# Patient Record
Sex: FEMALE | Race: WHITE | NOT HISPANIC OR LATINO | URBAN - METROPOLITAN AREA
[De-identification: names, ages, dates, MRNs, and addresses within clinical notes are randomized per-mention and may not be internally consistent; named-entity substitution may affect disease eponyms.]

---

## 2020-07-25 ENCOUNTER — TELEPHONE ENCOUNTER (OUTPATIENT)
Dept: URBAN - METROPOLITAN AREA CLINIC 13 | Facility: CLINIC | Age: 50
End: 2020-07-25

## 2020-07-25 RX ORDER — AMITRIPTYLINE HYDROCHLORIDE 10 MG/1
TAKE 1 TABLET AT BEDTIME TABLET, FILM COATED ORAL
Refills: 0 | OUTPATIENT
End: 2019-05-10

## 2020-07-25 RX ORDER — DICYCLOMINE HYDROCHLORIDE 10 MG/1
TAKE 1 CAPSULE 3 TIMES DAILY CAPSULE ORAL
Refills: 0 | OUTPATIENT
End: 2019-05-10

## 2020-07-25 RX ORDER — IBUPROFEN 600 MG/1
TAKE 1 TABLET EVERY 6 TO 8 HOURS AS NEEDED TABLET ORAL
Refills: 0 | OUTPATIENT
End: 2019-05-10

## 2020-07-25 RX ORDER — DEXLANSOPRAZOLE 60 MG/1
TAKE 1 CAPSULE DAILY EVERY MORNING BEFORE BREAKFAST CAPSULE, DELAYED RELEASE ORAL
Qty: 90 | Refills: 3 | OUTPATIENT
Start: 2019-05-10 | End: 2020-03-31

## 2020-07-25 RX ORDER — OMEPRAZOLE 40 MG/1
TAKE 1 CAPSULE TWICE DAILY CAPSULE, DELAYED RELEASE ORAL
Refills: 0 | OUTPATIENT
End: 2018-09-07

## 2020-07-25 RX ORDER — PROMETHAZINE HYDROCHLORIDE 25 MG/1
TAKE 1 TABLET BY MOUTH EVERY 8 HOURS AS NEEDED FOR NAUSEA TABLET ORAL
Refills: 0 | OUTPATIENT
End: 2019-11-21

## 2020-07-25 RX ORDER — PROMETHAZINE HYDROCHLORIDE 25 MG/1
INSERT 1 SUPP EVERY 6-8 HOURS PRN FOR NAUSEA SUPPOSITORY RECTAL
Qty: 12 | Refills: 3 | OUTPATIENT
Start: 2018-10-05 | End: 2019-11-21

## 2020-07-25 RX ORDER — BUSPIRONE HYDROCHLORIDE 5 MG/1
TAKE 1 TABLET BY MOUTH TWICE DAILY TABLET ORAL
Qty: 60 | Refills: 5 | OUTPATIENT
Start: 2018-09-07 | End: 2019-05-10

## 2020-07-25 RX ORDER — HALOPERIDOL 5 MG/1
TAKE 1 TABLET TWICE DAILY AS NEEDED TABLET ORAL
Refills: 0 | OUTPATIENT
End: 2018-10-08

## 2020-07-25 RX ORDER — PANTOPRAZOLE SODIUM 40 MG/1
TAKE ONE TABLET BY MOUTH TWICE A DAY TABLET, DELAYED RELEASE ORAL
Qty: 180 | Refills: 3 | OUTPATIENT
End: 2019-07-02

## 2020-07-25 RX ORDER — CITALOPRAM 20 MG/1
TAKE 1 TABLET DAILY AS DIRECTED TABLET ORAL
Qty: 30 | Refills: 11 | OUTPATIENT
Start: 2018-07-20 | End: 2018-09-07

## 2020-07-25 RX ORDER — AMITRIPTYLINE HYDROCHLORIDE 25 MG/1
TAKE 1 TABLET AT BEDTIME TABLET, FILM COATED ORAL
Qty: 30 | Refills: 3 | OUTPATIENT
End: 2018-10-02

## 2020-07-25 RX ORDER — OMEPRAZOLE 20 MG/1
TAKE 1 TABLET DAILY TABLET, DELAYED RELEASE ORAL
Refills: 3 | OUTPATIENT
End: 2019-07-02

## 2020-07-25 RX ORDER — VANCOMYCIN HYDROCHLORIDE 125 MG/1
TAKE 1 CAPSULE 4 TIMES DAILY CAPSULE ORAL
Refills: 0 | OUTPATIENT
Start: 2009-07-07 | End: 2018-07-02

## 2020-07-25 RX ORDER — SUCRALFATE 1 G/1
TAKE 1 TABLET EVERY 6 HOURS TABLET ORAL
Refills: 0 | OUTPATIENT
End: 2018-09-07

## 2020-07-25 RX ORDER — PROMETHAZINE HYDROCHLORIDE 25 MG/1
TAKE 1 TABLET EVERY 8 HOURS PRN TABLET ORAL
Qty: 90 | Refills: 1 | OUTPATIENT
Start: 2019-08-09 | End: 2019-11-21

## 2020-07-25 RX ORDER — FLUOXETINE HYDROCHLORIDE 40 MG/1
CAPSULE ORAL
Refills: 0 | OUTPATIENT
Start: 2019-05-10 | End: 2019-12-10

## 2020-07-25 RX ORDER — SCOLOPAMINE TRANSDERMAL SYSTEM 1 MG/1
APPLY 1 PATCH EVERY 3 DAYS PATCH, EXTENDED RELEASE TRANSDERMAL
Qty: 1 | Refills: 3 | OUTPATIENT
Start: 2018-09-07 | End: 2019-05-10

## 2020-07-25 RX ORDER — MEDROXYPROGESTERONE ACETATE 2.5 MG/1
TAKE 1 TABLET DAILY AS DIRECTED TABLET ORAL
Refills: 0 | OUTPATIENT
End: 2019-12-10

## 2020-07-25 RX ORDER — ONDANSETRON HCL 8 MG
TAKE 1 TABLET EVERY 8 HOURS AS NEEDED TABLET ORAL
Qty: 90 | Refills: 1 | OUTPATIENT
End: 2018-10-02

## 2020-07-26 ENCOUNTER — TELEPHONE ENCOUNTER (OUTPATIENT)
Dept: URBAN - METROPOLITAN AREA CLINIC 13 | Facility: CLINIC | Age: 50
End: 2020-07-26

## 2020-07-26 RX ORDER — PREDNISONE 20 MG/1
TABLET ORAL
Qty: 6 | Refills: 0 | Status: ACTIVE | COMMUNITY
Start: 2018-11-26

## 2020-07-26 RX ORDER — ALPRAZOLAM 2 MG/1
TABLET ORAL
Qty: 180 | Refills: 0 | Status: ACTIVE | COMMUNITY
Start: 2019-03-27

## 2020-07-26 RX ORDER — ONDANSETRON HYDROCHLORIDE 4 MG/1
TABLET, FILM COATED ORAL
Qty: 30 | Refills: 0 | Status: ACTIVE | COMMUNITY
Start: 2018-12-18

## 2020-07-26 RX ORDER — ONDANSETRON HYDROCHLORIDE 4 MG/1
TABLET, FILM COATED ORAL
Qty: 30 | Refills: 0 | Status: ACTIVE | COMMUNITY
Start: 2019-01-16

## 2020-07-26 RX ORDER — FLUCONAZOLE 100 MG/1
TABLET ORAL
Qty: 8 | Refills: 0 | Status: ACTIVE | COMMUNITY
Start: 2019-10-16

## 2020-07-26 RX ORDER — AMOXICILLIN AND CLAVULANATE POTASSIUM 875; 125 MG/1; MG/1
TABLET, FILM COATED ORAL
Qty: 20 | Refills: 0 | Status: ACTIVE | COMMUNITY
Start: 2018-12-18

## 2020-07-26 RX ORDER — ESTRADIOL 1 MG/1
TABLET ORAL
Qty: 90 | Refills: 0 | Status: ACTIVE | COMMUNITY
Start: 2019-07-17

## 2020-07-26 RX ORDER — ONDANSETRON HYDROCHLORIDE 4 MG/1
TABLET, FILM COATED ORAL
Qty: 30 | Refills: 0 | Status: ACTIVE | COMMUNITY
Start: 2018-07-25

## 2020-07-26 RX ORDER — FLUCONAZOLE 150 MG/1
TABLET ORAL
Qty: 2 | Refills: 0 | Status: ACTIVE | COMMUNITY
Start: 2018-12-18

## 2020-07-26 RX ORDER — CYCLOBENZAPRINE HYDROCHLORIDE 10 MG/1
TABLET, FILM COATED ORAL
Qty: 15 | Refills: 0 | Status: ACTIVE | COMMUNITY
Start: 2018-11-26

## 2020-07-26 RX ORDER — BUPROPION HYDROCHLORIDE 75 MG/1
TABLET ORAL
Qty: 90 | Refills: 0 | Status: ACTIVE | COMMUNITY
Start: 2020-02-18

## 2020-07-26 RX ORDER — PROMETHAZINE HYDROCHLORIDE 12.5 MG/1
TABLET ORAL
Qty: 30 | Refills: 0 | Status: ACTIVE | COMMUNITY
Start: 2019-06-21

## 2020-07-26 RX ORDER — FLUOXETINE 10 MG/1
CAPSULE ORAL
Qty: 30 | Refills: 0 | Status: ACTIVE | COMMUNITY
Start: 2018-10-08

## 2020-07-26 RX ORDER — EPINEPHRINE 0.3 MG/.3ML
INJECTION INTRAMUSCULAR
Qty: 2 | Refills: 0 | Status: ACTIVE | COMMUNITY
Start: 2019-04-29

## 2020-07-26 RX ORDER — ZOLPIDEM TARTRATE 12.5 MG/1
TABLET, EXTENDED RELEASE ORAL
Qty: 30 | Refills: 0 | Status: ACTIVE | COMMUNITY
Start: 2018-04-06

## 2020-07-26 RX ORDER — DEXTROAMPHETAMINE SACCHARATE, AMPHETAMINE ASPARTATE, DEXTROAMPHETAMINE SULFATE AND AMPHETAMINE SULFATE 5; 5; 5; 5 MG/1; MG/1; MG/1; MG/1
TABLET ORAL
Qty: 180 | Refills: 0 | Status: ACTIVE | COMMUNITY
Start: 2019-01-11

## 2020-07-26 RX ORDER — DICYCLOMINE HYDROCHLORIDE 20 MG/1
TABLET ORAL
Qty: 30 | Refills: 0 | Status: ACTIVE | COMMUNITY
Start: 2018-01-31

## 2020-07-26 RX ORDER — HYDROXYZINE PAMOATE 25 MG/1
CAPSULE ORAL
Qty: 90 | Refills: 0 | Status: ACTIVE | COMMUNITY
Start: 2018-09-19

## 2020-07-26 RX ORDER — TRAMADOL HYDROCHLORIDE 50 MG/1
TAKE 1 TABLET 4 TIMES DAILY AS NEEDED FOR PAIN TABLET ORAL
Qty: 20 | Refills: 0 | Status: ACTIVE | COMMUNITY

## 2020-07-26 RX ORDER — ACYCLOVIR 400 MG/1
TABLET ORAL
Qty: 24 | Refills: 0 | Status: ACTIVE | COMMUNITY
Start: 2019-10-18

## 2020-07-26 RX ORDER — ESOMEPRAZOLE MAGNESIUM 40 MG/1
TAKE 1 CAPSULE DAILY CAPSULE, DELAYED RELEASE PELLETS ORAL
Qty: 90 | Refills: 2 | Status: ACTIVE | COMMUNITY
Start: 2020-03-31

## 2020-07-26 RX ORDER — IBUPROFEN 800 MG/1
TABLET ORAL
Qty: 30 | Refills: 0 | Status: ACTIVE | COMMUNITY
Start: 2018-09-08

## 2020-07-26 RX ORDER — DEXTROAMPHETAMINE SACCHARATE, AMPHETAMINE ASPARTATE, DEXTROAMPHETAMINE SULFATE AND AMPHETAMINE SULFATE 5; 5; 5; 5 MG/1; MG/1; MG/1; MG/1
TABLET ORAL
Qty: 60 | Refills: 0 | Status: ACTIVE | COMMUNITY
Start: 2018-05-18

## 2020-07-26 RX ORDER — HYDROCODONE BITARTRATE AND ACETAMINOPHEN 5; 325 MG/1; MG/1
TABLET ORAL
Qty: 12 | Refills: 0 | Status: ACTIVE | COMMUNITY
Start: 2018-09-10

## 2020-07-26 RX ORDER — FLUOXETINE 10 MG/1
CAPSULE ORAL
Qty: 90 | Refills: 0 | Status: ACTIVE | COMMUNITY
Start: 2019-08-28

## 2020-07-26 RX ORDER — ONDANSETRON HYDROCHLORIDE 4 MG/1
TK 1 T PO Q 6 H PRN NV TABLET, FILM COATED ORAL
Qty: 30 | Refills: 0 | Status: ACTIVE | COMMUNITY
Start: 2018-01-31

## 2020-07-26 RX ORDER — AMOXICILLIN 875 MG/1
TABLET, FILM COATED ORAL
Qty: 20 | Refills: 0 | Status: ACTIVE | COMMUNITY
Start: 2019-01-26

## 2020-07-26 RX ORDER — ALPRAZOLAM 0.25 MG/1
TABLET ORAL
Qty: 2 | Refills: 0 | Status: ACTIVE | COMMUNITY
Start: 2018-10-01

## 2020-07-26 RX ORDER — PROMETHAZINE HYDROCHLORIDE 25 MG/1
TAKE 1 TABLET EVERY 6 HOURS PRN NAUSEA TABLET ORAL
Qty: 60 | Refills: 3 | Status: ACTIVE | COMMUNITY
Start: 2019-12-10

## 2020-07-26 RX ORDER — PROMETHAZINE HYDROCHLORIDE 12.5 MG/1
TABLET ORAL
Qty: 30 | Refills: 0 | Status: ACTIVE | COMMUNITY
Start: 2018-06-11

## 2020-07-26 RX ORDER — CHLORHEXIDINE GLUCONATE 0.12% ORAL RINSE 1.2 MG/ML
SOLUTION ORAL
Qty: 473 | Refills: 0 | Status: ACTIVE | COMMUNITY
Start: 2018-09-19

## 2020-07-26 RX ORDER — HYDROCODONE BITARTRATE AND ACETAMINOPHEN 5; 325 MG/1; MG/1
TABLET ORAL
Qty: 16 | Refills: 0 | Status: ACTIVE | COMMUNITY
Start: 2018-09-27

## 2020-07-26 RX ORDER — DEXTROAMPHETAMINE SACCHARATE, AMPHETAMINE ASPARTATE, DEXTROAMPHETAMINE SULFATE AND AMPHETAMINE SULFATE 5; 5; 5; 5 MG/1; MG/1; MG/1; MG/1
TK 2 TS PO QD TABLET ORAL
Qty: 60 | Refills: 0 | Status: ACTIVE | COMMUNITY
Start: 2018-02-05

## 2020-07-26 RX ORDER — PROMETHAZINE HYDROCHLORIDE 12.5 MG/1
TABLET ORAL
Qty: 30 | Refills: 0 | Status: ACTIVE | COMMUNITY
Start: 2018-07-15

## 2020-07-26 RX ORDER — DRONABINOL 10 MG/1
TAKE 1 CAPSULE TWICE DAILY CAPSULE ORAL
Qty: 60 | Refills: 5 | Status: ACTIVE | COMMUNITY
Start: 2019-05-10

## 2020-07-26 RX ORDER — METOPROLOL SUCCINATE 25 MG/1
TAKE 1 TABLET DAILY TABLET, EXTENDED RELEASE ORAL
Refills: 0 | Status: ACTIVE | COMMUNITY
Start: 2019-05-10

## 2020-07-26 RX ORDER — LOSARTAN POTASSIUM 100 MG/1
TAKE 1 TABLET DAILY TABLET, FILM COATED ORAL
Refills: 0 | Status: ACTIVE | COMMUNITY

## 2020-07-26 RX ORDER — ALBUTEROL SULFATE 90 UG/1
AEROSOL, METERED RESPIRATORY (INHALATION)
Qty: 8 | Refills: 0 | Status: ACTIVE | COMMUNITY
Start: 2017-10-08

## 2020-07-26 RX ORDER — OXYCODONE HYDROCHLORIDE 5 MG/1
TABLET ORAL
Qty: 30 | Refills: 0 | Status: ACTIVE | COMMUNITY
Start: 2019-07-17

## 2020-07-26 RX ORDER — DEXTROAMPHETAMINE SACCHARATE, AMPHETAMINE ASPARTATE, DEXTROAMPHETAMINE SULFATE AND AMPHETAMINE SULFATE 5; 5; 5; 5 MG/1; MG/1; MG/1; MG/1
TABLET ORAL
Qty: 60 | Refills: 0 | Status: ACTIVE | COMMUNITY
Start: 2018-01-08

## 2020-07-26 RX ORDER — PROMETHAZINE HYDROCHLORIDE AND DEXTROMETHORPHAN HYDROBROMIDE 6.25; 15 MG/5ML; MG/5ML
SOLUTION ORAL
Qty: 120 | Refills: 0 | Status: ACTIVE | COMMUNITY
Start: 2019-01-26

## 2020-07-26 RX ORDER — TIZANIDINE 4 MG/1
TABLET ORAL
Qty: 30 | Refills: 0 | Status: ACTIVE | COMMUNITY
Start: 2018-11-29

## 2020-07-26 RX ORDER — ALPRAZOLAM 2 MG/1
TABLET ORAL
Qty: 180 | Refills: 0 | Status: ACTIVE | COMMUNITY
Start: 2019-01-11

## 2020-07-26 RX ORDER — FLUCONAZOLE 150 MG/1
TABLET ORAL
Qty: 2 | Refills: 0 | Status: ACTIVE | COMMUNITY
Start: 2019-03-21

## 2020-07-26 RX ORDER — CLONAZEPAM 1 MG/1
TK 1 T PO QHS TABLET ORAL
Qty: 30 | Refills: 0 | Status: ACTIVE | COMMUNITY
Start: 2018-01-08

## 2020-07-26 RX ORDER — PREDNISONE 10 MG/1
TABLET ORAL
Qty: 14 | Refills: 0 | Status: ACTIVE | COMMUNITY
Start: 2020-02-20

## 2020-07-26 RX ORDER — ALPRAZOLAM 0.5 MG/1
TAKE ONE TABLET BY MOUTH EVERY 6 TO 8 HOURS AS NEEDED TABLET ORAL
Qty: 30 | Refills: 2 | Status: ACTIVE | COMMUNITY
Start: 2018-09-07

## 2020-07-26 RX ORDER — IBUPROFEN 800 MG/1
TABLET ORAL
Qty: 30 | Refills: 0 | Status: ACTIVE | COMMUNITY
Start: 2019-07-17

## 2020-07-26 RX ORDER — HYDROCODONE BITARTRATE AND ACETAMINOPHEN 5; 325 MG/1; MG/1
TABLET ORAL
Qty: 4 | Refills: 0 | Status: ACTIVE | COMMUNITY
Start: 2019-10-09

## 2020-07-26 RX ORDER — DESIPRAMINE HYDROCHLORIDE 10 MG/1
TABLET, FILM COATED ORAL
Qty: 30 | Refills: 0 | Status: ACTIVE | COMMUNITY
Start: 2018-01-31

## 2020-07-26 RX ORDER — LINACLOTIDE 72 UG/1
CAPSULE, GELATIN COATED ORAL
Qty: 30 | Refills: 0 | Status: ACTIVE | COMMUNITY
Start: 2018-06-19

## 2020-07-26 RX ORDER — PREDNISONE 20 MG/1
TABLET ORAL
Qty: 10 | Refills: 0 | Status: ACTIVE | COMMUNITY
Start: 2020-02-26

## 2020-07-26 RX ORDER — ACYCLOVIR 800 MG/1
TABLET ORAL
Qty: 20 | Refills: 0 | Status: ACTIVE | COMMUNITY
Start: 2018-02-16

## 2020-07-26 RX ORDER — ACYCLOVIR 800 MG/1
TK 1 T PO BID TABLET ORAL
Qty: 20 | Refills: 0 | Status: ACTIVE | COMMUNITY
Start: 2017-07-24

## 2020-07-26 RX ORDER — ONDANSETRON 4 MG/1
TABLET, ORALLY DISINTEGRATING ORAL
Qty: 30 | Refills: 0 | Status: ACTIVE | COMMUNITY
Start: 2018-08-03

## 2020-07-26 RX ORDER — CEFUROXIME AXETIL 250 MG/1
TABLET ORAL
Qty: 20 | Refills: 0 | Status: ACTIVE | COMMUNITY
Start: 2020-02-20

## 2020-07-26 RX ORDER — ZOLPIDEM TARTRATE 10 MG/1
TABLET, FILM COATED ORAL
Qty: 15 | Refills: 0 | Status: ACTIVE | COMMUNITY
Start: 2018-09-09

## 2020-07-26 RX ORDER — DRONABINOL 5 MG/1
CAPSULE ORAL
Qty: 60 | Refills: 0 | Status: ACTIVE | COMMUNITY
Start: 2018-06-26

## 2020-07-26 RX ORDER — METOCLOPRAMIDE HYDROCHLORIDE 5 MG/5ML
SOLUTION ORAL
Qty: 946 | Refills: 0 | Status: ACTIVE | COMMUNITY
Start: 2019-05-24

## 2020-07-26 RX ORDER — BUTALBITA,ACETAMINOPHEN AND CAFFEINE 50; 300; 40 MG/1; MG/1; MG/1
CAPSULE ORAL
Qty: 12 | Refills: 0 | Status: ACTIVE | COMMUNITY
Start: 2019-03-26

## 2020-07-26 RX ORDER — ALPRAZOLAM 1 MG/1
TABLET ORAL
Qty: 30 | Refills: 0 | Status: ACTIVE | COMMUNITY
Start: 2018-05-18

## 2020-07-26 RX ORDER — HYDROCODONE BITARTRATE AND ACETAMINOPHEN 7.5; 325 MG/15ML; MG/15ML
SOLUTION ORAL
Qty: 473 | Refills: 0 | Status: ACTIVE | COMMUNITY
Start: 2018-05-25

## 2020-07-26 RX ORDER — ZOLPIDEM TARTRATE 12.5 MG/1
TABLET, EXTENDED RELEASE ORAL
Qty: 30 | Refills: 0 | Status: ACTIVE | COMMUNITY
Start: 2018-05-18

## 2020-07-26 RX ORDER — FLUOXETINE 10 MG/1
CAPSULE ORAL
Qty: 90 | Refills: 0 | Status: ACTIVE | COMMUNITY
Start: 2019-01-11

## 2020-07-26 RX ORDER — CLINDAMYCIN HYDROCHLORIDE 150 MG/1
CAPSULE ORAL
Qty: 60 | Refills: 0 | Status: ACTIVE | COMMUNITY
Start: 2018-09-09

## 2020-07-26 RX ORDER — DRONABINOL 2.5 MG/1
CAPSULE ORAL
Qty: 60 | Refills: 0 | Status: ACTIVE | COMMUNITY
Start: 2018-06-11

## 2020-07-26 RX ORDER — ESTRADIOL 0.05 MG/D
FILM, EXTENDED RELEASE TRANSDERMAL
Qty: 8 | Refills: 0 | Status: ACTIVE | COMMUNITY
Start: 2018-02-27

## 2020-07-26 RX ORDER — TRIAMCINOLONE ACETONIDE 1 MG/G
CREAM TOPICAL
Qty: 30 | Refills: 0 | Status: ACTIVE | COMMUNITY
Start: 2019-10-18

## 2020-07-26 RX ORDER — METHOCARBAMOL 750 MG/1
TABLET, FILM COATED ORAL
Qty: 10 | Refills: 0 | Status: ACTIVE | COMMUNITY
Start: 2019-04-29

## 2020-07-26 RX ORDER — LORAZEPAM 1 MG/1
TABLET ORAL
Qty: 30 | Refills: 0 | Status: ACTIVE | COMMUNITY
Start: 2018-08-15

## 2020-07-26 RX ORDER — QUETIAPINE FUMARATE 50 MG/1
TABLET, FILM COATED ORAL
Qty: 30 | Refills: 0 | Status: ACTIVE | COMMUNITY
Start: 2018-08-03

## 2020-07-26 RX ORDER — PENICILLIN V POTASSIUM 500 MG/1
TABLET, FILM COATED ORAL
Qty: 14 | Refills: 0 | Status: ACTIVE | COMMUNITY
Start: 2018-09-10

## 2020-07-26 RX ORDER — CODEINE PHOSPHATE AND GUAIFENESIN 10; 100 MG/5ML; MG/5ML
SOLUTION ORAL
Qty: 120 | Refills: 0 | Status: ACTIVE | COMMUNITY
Start: 2020-02-26

## 2020-07-26 RX ORDER — NIFEDIPINE 10 MG/1
CAPSULE ORAL
Qty: 30 | Refills: 0 | Status: ACTIVE | COMMUNITY
Start: 2018-08-03

## 2020-07-26 RX ORDER — DEXTROAMPHETAMINE SACCHARATE, AMPHETAMINE ASPARTATE, DEXTROAMPHETAMINE SULFATE AND AMPHETAMINE SULFATE 5; 5; 5; 5 MG/1; MG/1; MG/1; MG/1
TABLET ORAL
Qty: 180 | Refills: 0 | Status: ACTIVE | COMMUNITY
Start: 2019-08-02

## 2020-07-26 RX ORDER — LORAZEPAM 1 MG/1
TK 1 T PO  BID PRN FOR ANXIETY TABLET ORAL
Qty: 60 | Refills: 0 | Status: ACTIVE | COMMUNITY
Start: 2018-06-18

## 2020-07-26 RX ORDER — NAPROXEN 500 MG/1
TABLET ORAL
Qty: 20 | Refills: 0 | Status: ACTIVE | COMMUNITY
Start: 2017-12-07

## 2020-07-26 RX ORDER — PREDNISONE 20 MG/1
TK 3 TS PO QD FOR 4 DAYS TABLET ORAL
Qty: 12 | Refills: 0 | Status: ACTIVE | COMMUNITY
Start: 2018-09-10

## 2020-07-26 RX ORDER — ONDANSETRON 4 MG/1
TABLET, ORALLY DISINTEGRATING ORAL
Qty: 9 | Refills: 0 | Status: ACTIVE | COMMUNITY
Start: 2017-12-20

## 2020-07-26 RX ORDER — AMOXICILLIN AND CLAVULANATE POTASSIUM 875; 125 MG/1; MG/1
TABLET, FILM COATED ORAL
Qty: 20 | Refills: 0 | Status: ACTIVE | COMMUNITY
Start: 2019-03-21

## 2020-07-26 RX ORDER — AMOXICILLIN 500 MG/1
CAPSULE ORAL
Qty: 30 | Refills: 0 | Status: ACTIVE | COMMUNITY
Start: 2018-09-08

## 2020-07-26 RX ORDER — BUSPIRONE HYDROCHLORIDE 10 MG/1
TAKE 1 TABLET TWICE DAILY TABLET ORAL
Qty: 180 | Refills: 2 | Status: ACTIVE | COMMUNITY
Start: 2020-02-10

## 2020-07-26 RX ORDER — AMOXICILLIN 500 MG/1
CAPSULE ORAL
Qty: 22 | Refills: 0 | Status: ACTIVE | COMMUNITY
Start: 2019-10-08

## 2020-07-26 RX ORDER — DEXTROAMPHETAMINE SACCHARATE, AMPHETAMINE ASPARTATE, DEXTROAMPHETAMINE SULFATE AND AMPHETAMINE SULFATE 5; 5; 5; 5 MG/1; MG/1; MG/1; MG/1
TABLET ORAL
Qty: 30 | Refills: 0 | Status: ACTIVE | COMMUNITY
Start: 2018-12-13

## 2020-07-26 RX ORDER — METOPROLOL SUCCINATE 25 MG/1
TABLET, EXTENDED RELEASE ORAL
Qty: 90 | Refills: 0 | Status: ACTIVE | COMMUNITY
Start: 2019-04-08

## 2020-07-26 RX ORDER — LANSOPRAZOLE 30 MG/1
TAKE 1 CAPSULE TWICE DAILY 30 MINUTES BEFORE MEALS CAPSULE, DELAYED RELEASE ORAL
Qty: 60 | Refills: 5 | Status: ACTIVE | COMMUNITY
Start: 2020-02-10

## 2020-07-26 RX ORDER — ALPRAZOLAM 1 MG/1
TABLET ORAL
Qty: 120 | Refills: 0 | Status: ACTIVE | COMMUNITY
Start: 2018-12-06

## 2020-07-26 RX ORDER — PROMETHAZINE HYDROCHLORIDE 6.25 MG/5ML
TAKE 5 ML EVERY 6 HOURS PRN N/V SYRUP ORAL
Qty: 300 | Refills: 3 | Status: ACTIVE | COMMUNITY
Start: 2019-12-10

## 2020-07-26 RX ORDER — ESTRADIOL 0.07 MG/D
FILM, EXTENDED RELEASE TRANSDERMAL
Qty: 8 | Refills: 0 | Status: ACTIVE | COMMUNITY
Start: 2018-03-15

## 2020-07-26 RX ORDER — ZOLPIDEM TARTRATE 10 MG/1
TABLET, FILM COATED ORAL
Qty: 30 | Refills: 0 | Status: ACTIVE | COMMUNITY
Start: 2018-03-02

## 2020-07-26 RX ORDER — MIRTAZAPINE 15 MG/1
TABLET, FILM COATED ORAL
Qty: 30 | Refills: 0 | Status: ACTIVE | COMMUNITY
Start: 2018-08-03

## 2020-07-26 RX ORDER — TRIAZOLAM 0.25 MG/1
TABLET ORAL
Qty: 2 | Refills: 0 | Status: ACTIVE | COMMUNITY
Start: 2019-10-08

## 2020-07-26 RX ORDER — ESTRADIOL 0.07 MG/D
PLACE 1 PATCH  TWICE A WEEK PATCH TRANSDERMAL
Refills: 0 | Status: ACTIVE | COMMUNITY

## 2020-07-26 RX ORDER — ONDANSETRON 4 MG/1
TABLET, ORALLY DISINTEGRATING ORAL
Qty: 20 | Refills: 0 | Status: ACTIVE | COMMUNITY
Start: 2018-05-25

## 2020-07-26 RX ORDER — AMLODIPINE BESYLATE 5 MG/1
TABLET ORAL
Qty: 30 | Refills: 0 | Status: ACTIVE | COMMUNITY
Start: 2019-10-23

## 2020-07-26 RX ORDER — CHLORHEXIDINE GLUCONATE 0.12% ORAL RINSE 1.2 MG/ML
SOLUTION ORAL
Qty: 473 | Refills: 0 | Status: ACTIVE | COMMUNITY
Start: 2019-10-16

## 2020-07-26 RX ORDER — ESTRADIOL 2 MG/1
TAKE 1 TABLET DAILY TABLET ORAL
Refills: 0 | Status: ACTIVE | COMMUNITY
Start: 2019-07-29

## 2020-07-26 RX ORDER — FLUOXETINE 10 MG/1
CAPSULE ORAL
Qty: 90 | Refills: 0 | Status: ACTIVE | COMMUNITY
Start: 2019-06-19

## 2020-07-26 RX ORDER — MELOXICAM 15 MG/1
TABLET ORAL
Qty: 30 | Refills: 0 | Status: ACTIVE | COMMUNITY
Start: 2019-04-29

## 2020-07-26 RX ORDER — POLYETHYLENE GLYCOL 3350, SODIUM CHLORIDE, SODIUM BICARBONATE AND POTASSIUM CHLORIDE WITH LEMON FLAVOR 420; 11.2; 5.72; 1.48 G/4L; G/4L; G/4L; G/4L
POWDER, FOR SOLUTION ORAL
Qty: 4000 | Refills: 0 | Status: ACTIVE | COMMUNITY
Start: 2018-02-01

## 2020-07-26 RX ORDER — ZOLPIDEM TARTRATE 10 MG/1
TK 1 T PO HS TABLET, FILM COATED ORAL
Qty: 15 | Refills: 0 | Status: ACTIVE | COMMUNITY
Start: 2018-09-10

## 2020-07-26 RX ORDER — ESTRADIOL 0.1 MG/D
FILM, EXTENDED RELEASE TRANSDERMAL
Qty: 8 | Refills: 0 | Status: ACTIVE | COMMUNITY
Start: 2019-08-08

## 2020-07-26 RX ORDER — ALPRAZOLAM 2 MG/1
TABLET ORAL
Qty: 45 | Refills: 0 | Status: ACTIVE | COMMUNITY
Start: 2019-08-28

## 2020-07-26 RX ORDER — ALPRAZOLAM 2 MG/1
TABLET ORAL
Qty: 180 | Refills: 0 | Status: ACTIVE | COMMUNITY
Start: 2019-07-05

## 2020-07-26 RX ORDER — HYDROXYZINE HYDROCHLORIDE 25 MG/1
TAKE 1 TABLET EVERY 6 HOURS PRN TABLET, FILM COATED ORAL
Refills: 0 | Status: ACTIVE | COMMUNITY

## 2020-07-26 RX ORDER — DOXYCYCLINE HYCLATE 100 MG/1
TABLET ORAL
Qty: 20 | Refills: 0 | Status: ACTIVE | COMMUNITY
Start: 2020-02-26

## 2020-07-26 RX ORDER — PROCHLORPERAZINE MALEATE 10 MG/1
TAKE 1 TABLET EVERY 6 HOURS AS NEEDED FOR NAUSEA TABLET ORAL
Qty: 120 | Refills: 1 | Status: ACTIVE | COMMUNITY
Start: 2019-11-21

## 2020-08-06 ENCOUNTER — TELEPHONE ENCOUNTER (OUTPATIENT)
Dept: URBAN - METROPOLITAN AREA CLINIC 113 | Facility: CLINIC | Age: 50
End: 2020-08-06

## 2020-08-06 RX ORDER — OXYCODONE HYDROCHLORIDE 5 MG/1
TABLET ORAL
Qty: 30 | Refills: 0 | Status: ACTIVE | COMMUNITY
Start: 2019-07-17

## 2020-08-06 RX ORDER — BUPROPION HYDROCHLORIDE 75 MG/1
TABLET ORAL
Qty: 90 | Refills: 0 | Status: ACTIVE | COMMUNITY
Start: 2020-02-18

## 2020-08-06 RX ORDER — ALPRAZOLAM 2 MG/1
TABLET ORAL
Qty: 180 | Refills: 0 | Status: ACTIVE | COMMUNITY
Start: 2019-01-11

## 2020-08-06 RX ORDER — MELOXICAM 15 MG/1
TABLET ORAL
Qty: 30 | Refills: 0 | Status: ACTIVE | COMMUNITY
Start: 2019-04-29

## 2020-08-06 RX ORDER — ACYCLOVIR 400 MG/1
TABLET ORAL
Qty: 24 | Refills: 0 | Status: ACTIVE | COMMUNITY
Start: 2019-10-18

## 2020-08-06 RX ORDER — TIZANIDINE 4 MG/1
TABLET ORAL
Qty: 30 | Refills: 0 | Status: ACTIVE | COMMUNITY
Start: 2018-11-29

## 2020-08-06 RX ORDER — DRONABINOL 10 MG/1
TAKE 1 CAPSULE TWICE DAILY CAPSULE ORAL
Qty: 60 | Refills: 5 | Status: ACTIVE | COMMUNITY
Start: 2019-05-10

## 2020-08-06 RX ORDER — NIFEDIPINE 10 MG/1
CAPSULE ORAL
Qty: 30 | Refills: 0 | Status: ACTIVE | COMMUNITY
Start: 2018-08-03

## 2020-08-06 RX ORDER — ESTRADIOL 1 MG/1
TABLET ORAL
Qty: 90 | Refills: 0 | Status: ACTIVE | COMMUNITY
Start: 2019-07-17

## 2020-08-06 RX ORDER — CLINDAMYCIN HYDROCHLORIDE 150 MG/1
CAPSULE ORAL
Qty: 60 | Refills: 0 | Status: ACTIVE | COMMUNITY
Start: 2018-09-09

## 2020-08-06 RX ORDER — METHOCARBAMOL 750 MG/1
TABLET, FILM COATED ORAL
Qty: 10 | Refills: 0 | Status: ACTIVE | COMMUNITY
Start: 2019-04-29

## 2020-08-06 RX ORDER — ZOLPIDEM TARTRATE 10 MG/1
TABLET, FILM COATED ORAL
Qty: 30 | Refills: 0 | Status: ACTIVE | COMMUNITY
Start: 2018-03-02

## 2020-08-06 RX ORDER — ACYCLOVIR 800 MG/1
TK 1 T PO BID TABLET ORAL
Qty: 20 | Refills: 0 | Status: ACTIVE | COMMUNITY
Start: 2017-07-24

## 2020-08-06 RX ORDER — AMLODIPINE BESYLATE 5 MG/1
TABLET ORAL
Qty: 30 | Refills: 0 | Status: ACTIVE | COMMUNITY
Start: 2019-10-23

## 2020-08-06 RX ORDER — ESTRADIOL 2 MG/1
TAKE 1 TABLET DAILY TABLET ORAL
Refills: 0 | Status: ACTIVE | COMMUNITY
Start: 2019-07-29

## 2020-08-06 RX ORDER — ONDANSETRON 4 MG/1
TABLET, ORALLY DISINTEGRATING ORAL
Qty: 9 | Refills: 0 | Status: ACTIVE | COMMUNITY
Start: 2017-12-20

## 2020-08-06 RX ORDER — ALPRAZOLAM 0.25 MG/1
TABLET ORAL
Qty: 2 | Refills: 0 | Status: ACTIVE | COMMUNITY
Start: 2018-10-01

## 2020-08-06 RX ORDER — PROMETHAZINE HYDROCHLORIDE 25 MG/1
TAKE 1 TABLET EVERY 6 HOURS PRN NAUSEA TABLET ORAL
Qty: 60 | Refills: 3 | Status: ACTIVE | COMMUNITY
Start: 2019-12-10

## 2020-08-06 RX ORDER — LORAZEPAM 1 MG/1
TK 1 T PO  BID PRN FOR ANXIETY TABLET ORAL
Qty: 60 | Refills: 0 | Status: ACTIVE | COMMUNITY
Start: 2018-06-18

## 2020-08-06 RX ORDER — HYDROXYZINE PAMOATE 25 MG/1
CAPSULE ORAL
Qty: 90 | Refills: 0 | Status: ACTIVE | COMMUNITY
Start: 2018-09-19

## 2020-08-06 RX ORDER — POLYETHYLENE GLYCOL 3350, SODIUM CHLORIDE, SODIUM BICARBONATE AND POTASSIUM CHLORIDE WITH LEMON FLAVOR 420; 11.2; 5.72; 1.48 G/4L; G/4L; G/4L; G/4L
POWDER, FOR SOLUTION ORAL
Qty: 4000 | Refills: 0 | Status: ACTIVE | COMMUNITY
Start: 2018-02-01

## 2020-08-06 RX ORDER — PROCHLORPERAZINE MALEATE 10 MG/1
TAKE 1 TABLET EVERY 6 HOURS AS NEEDED FOR NAUSEA TABLET ORAL
Qty: 120 | Refills: 1 | Status: ACTIVE | COMMUNITY
Start: 2019-11-21

## 2020-08-06 RX ORDER — AMOXICILLIN 500 MG/1
CAPSULE ORAL
Qty: 30 | Refills: 0 | Status: ACTIVE | COMMUNITY
Start: 2018-09-08

## 2020-08-06 RX ORDER — CEFUROXIME AXETIL 250 MG/1
TABLET ORAL
Qty: 20 | Refills: 0 | Status: ACTIVE | COMMUNITY
Start: 2020-02-20

## 2020-08-06 RX ORDER — ESTRADIOL 0.1 MG/D
FILM, EXTENDED RELEASE TRANSDERMAL
Qty: 8 | Refills: 0 | Status: ACTIVE | COMMUNITY
Start: 2019-08-08

## 2020-08-06 RX ORDER — TRIAMCINOLONE ACETONIDE 1 MG/G
CREAM TOPICAL
Qty: 30 | Refills: 0 | Status: ACTIVE | COMMUNITY
Start: 2019-10-18

## 2020-08-06 RX ORDER — BUSPIRONE HYDROCHLORIDE 10 MG/1
TAKE 1 TABLET TWICE DAILY TABLET ORAL
Qty: 180 | Refills: 2 | Status: ACTIVE | COMMUNITY
Start: 2020-02-10

## 2020-08-06 RX ORDER — METOCLOPRAMIDE HYDROCHLORIDE 5 MG/5ML
SOLUTION ORAL
Qty: 946 | Refills: 0 | Status: ACTIVE | COMMUNITY
Start: 2019-05-24

## 2020-08-06 RX ORDER — DESIPRAMINE HYDROCHLORIDE 10 MG/1
TABLET, FILM COATED ORAL
Qty: 30 | Refills: 0 | Status: ACTIVE | COMMUNITY
Start: 2018-01-31

## 2020-08-06 RX ORDER — FLUOXETINE 10 MG/1
CAPSULE ORAL
Qty: 30 | Refills: 0 | Status: ACTIVE | COMMUNITY
Start: 2018-10-08

## 2020-08-06 RX ORDER — PROMETHAZINE HYDROCHLORIDE 6.25 MG/5ML
TAKE 5 ML EVERY 6 HOURS PRN N/V SYRUP ORAL
Qty: 300 | Refills: 3 | Status: ACTIVE | COMMUNITY
Start: 2019-12-10

## 2020-08-06 RX ORDER — DICYCLOMINE HYDROCHLORIDE 20 MG/1
TABLET ORAL
Qty: 30 | Refills: 0 | Status: ACTIVE | COMMUNITY
Start: 2018-01-31

## 2020-08-06 RX ORDER — LANSOPRAZOLE 30 MG/1
TAKE 1 CAPSULE TWICE DAILY 30 MINUTES BEFORE MEALS CAPSULE, DELAYED RELEASE ORAL
Qty: 60 | Refills: 5 | Status: ACTIVE | COMMUNITY
Start: 2020-02-10

## 2020-08-06 RX ORDER — TRAMADOL HYDROCHLORIDE 50 MG/1
TAKE 1 TABLET 4 TIMES DAILY AS NEEDED FOR PAIN TABLET ORAL
Qty: 20 | Refills: 0 | Status: ACTIVE | COMMUNITY

## 2020-08-06 RX ORDER — DRONABINOL 5 MG/1
CAPSULE ORAL
Qty: 60 | Refills: 0 | Status: ACTIVE | COMMUNITY
Start: 2018-06-26

## 2020-08-06 RX ORDER — PROMETHAZINE HYDROCHLORIDE AND DEXTROMETHORPHAN HYDROBROMIDE 6.25; 15 MG/5ML; MG/5ML
SOLUTION ORAL
Qty: 120 | Refills: 0 | Status: ACTIVE | COMMUNITY
Start: 2019-01-26

## 2020-08-06 RX ORDER — LOSARTAN POTASSIUM 100 MG/1
TAKE 1 TABLET DAILY TABLET, FILM COATED ORAL
Refills: 0 | Status: ACTIVE | COMMUNITY

## 2020-08-06 RX ORDER — HYDROCODONE BITARTRATE AND ACETAMINOPHEN 7.5; 325 MG/15ML; MG/15ML
SOLUTION ORAL
Qty: 473 | Refills: 0 | Status: ACTIVE | COMMUNITY
Start: 2018-05-25

## 2020-08-06 RX ORDER — ESTRADIOL 0.05 MG/D
FILM, EXTENDED RELEASE TRANSDERMAL
Qty: 8 | Refills: 0 | Status: ACTIVE | COMMUNITY
Start: 2018-02-27

## 2020-08-06 RX ORDER — ALBUTEROL SULFATE 90 UG/1
AEROSOL, METERED RESPIRATORY (INHALATION)
Qty: 8 | Refills: 0 | Status: ACTIVE | COMMUNITY
Start: 2017-10-08

## 2020-08-06 RX ORDER — ZOLPIDEM TARTRATE 12.5 MG/1
TABLET, EXTENDED RELEASE ORAL
Qty: 30 | Refills: 0 | Status: ACTIVE | COMMUNITY
Start: 2018-04-06

## 2020-08-06 RX ORDER — ESTRADIOL 0.07 MG/D
PLACE 1 PATCH  TWICE A WEEK PATCH TRANSDERMAL
Refills: 0 | Status: ACTIVE | COMMUNITY

## 2020-08-06 RX ORDER — ESOMEPRAZOLE MAGNESIUM 40 MG/1
TAKE 1 CAPSULE DAILY CAPSULE, DELAYED RELEASE PELLETS ORAL
Qty: 90 | Refills: 2 | Status: ACTIVE | COMMUNITY
Start: 2020-03-31

## 2020-08-06 RX ORDER — CYCLOBENZAPRINE HYDROCHLORIDE 10 MG/1
TABLET, FILM COATED ORAL
Qty: 15 | Refills: 0 | Status: ACTIVE | COMMUNITY
Start: 2018-11-26

## 2020-08-06 RX ORDER — PREDNISONE 20 MG/1
TK 3 TS PO QD FOR 4 DAYS TABLET ORAL
Qty: 12 | Refills: 0 | Status: ACTIVE | COMMUNITY
Start: 2018-09-10

## 2020-08-06 RX ORDER — PENICILLIN V POTASSIUM 500 MG/1
TABLET, FILM COATED ORAL
Qty: 14 | Refills: 0 | Status: ACTIVE | COMMUNITY
Start: 2018-09-10

## 2020-08-06 RX ORDER — ESTRADIOL 0.07 MG/D
FILM, EXTENDED RELEASE TRANSDERMAL
Qty: 8 | Refills: 0 | Status: ACTIVE | COMMUNITY
Start: 2018-03-15

## 2020-08-06 RX ORDER — CODEINE PHOSPHATE AND GUAIFENESIN 10; 100 MG/5ML; MG/5ML
SOLUTION ORAL
Qty: 120 | Refills: 0 | Status: ACTIVE | COMMUNITY
Start: 2020-02-26

## 2020-08-06 RX ORDER — MIRTAZAPINE 15 MG/1
TABLET, FILM COATED ORAL
Qty: 30 | Refills: 0 | Status: ACTIVE | COMMUNITY
Start: 2018-08-03

## 2020-08-06 RX ORDER — IBUPROFEN 800 MG/1
TABLET ORAL
Qty: 30 | Refills: 0 | Status: ACTIVE | COMMUNITY
Start: 2018-09-08

## 2020-08-06 RX ORDER — METOPROLOL SUCCINATE 25 MG/1
TABLET, EXTENDED RELEASE ORAL
Qty: 90 | Refills: 0 | Status: ACTIVE | COMMUNITY
Start: 2019-04-08

## 2020-08-06 RX ORDER — TRIAZOLAM 0.25 MG/1
TABLET ORAL
Qty: 2 | Refills: 0 | Status: ACTIVE | COMMUNITY
Start: 2019-10-08

## 2020-08-06 RX ORDER — DEXLANSOPRAZOLE 60 MG/1
1 CAPSULE CAPSULE, DELAYED RELEASE ORAL ONCE A DAY
Qty: 30 CAPSULE | Refills: 6 | OUTPATIENT
Start: 2020-08-06

## 2020-08-06 RX ORDER — DOXYCYCLINE HYCLATE 100 MG/1
TABLET ORAL
Qty: 20 | Refills: 0 | Status: ACTIVE | COMMUNITY
Start: 2020-02-26

## 2020-08-06 RX ORDER — CHLORHEXIDINE GLUCONATE 0.12% ORAL RINSE 1.2 MG/ML
SOLUTION ORAL
Qty: 473 | Refills: 0 | Status: ACTIVE | COMMUNITY
Start: 2018-09-19

## 2020-08-06 RX ORDER — AMOXICILLIN 875 MG/1
TABLET, FILM COATED ORAL
Qty: 20 | Refills: 0 | Status: ACTIVE | COMMUNITY
Start: 2019-01-26

## 2020-08-06 RX ORDER — CLONAZEPAM 1 MG/1
TK 1 T PO QHS TABLET ORAL
Qty: 30 | Refills: 0 | Status: ACTIVE | COMMUNITY
Start: 2018-01-08

## 2020-08-06 RX ORDER — EPINEPHRINE 0.3 MG/.3ML
INJECTION INTRAMUSCULAR
Qty: 2 | Refills: 0 | Status: ACTIVE | COMMUNITY
Start: 2019-04-29

## 2020-08-06 RX ORDER — QUETIAPINE FUMARATE 50 MG/1
TABLET, FILM COATED ORAL
Qty: 30 | Refills: 0 | Status: ACTIVE | COMMUNITY
Start: 2018-08-03

## 2020-08-06 RX ORDER — FLUCONAZOLE 100 MG/1
TABLET ORAL
Qty: 8 | Refills: 0 | Status: ACTIVE | COMMUNITY
Start: 2019-10-16

## 2020-08-06 RX ORDER — PROMETHAZINE HYDROCHLORIDE 12.5 MG/1
TABLET ORAL
Qty: 30 | Refills: 0 | Status: ACTIVE | COMMUNITY
Start: 2018-06-11

## 2020-08-06 RX ORDER — NAPROXEN 500 MG/1
TABLET ORAL
Qty: 20 | Refills: 0 | Status: ACTIVE | COMMUNITY
Start: 2017-12-07

## 2020-08-06 RX ORDER — IBUPROFEN 800 MG/1
TABLET ORAL
Qty: 30 | Refills: 0 | Status: ACTIVE | COMMUNITY
Start: 2019-07-17

## 2020-08-06 RX ORDER — BUTALBITA,ACETAMINOPHEN AND CAFFEINE 50; 300; 40 MG/1; MG/1; MG/1
CAPSULE ORAL
Qty: 12 | Refills: 0 | Status: ACTIVE | COMMUNITY
Start: 2019-03-26

## 2020-08-06 RX ORDER — AMOXICILLIN AND CLAVULANATE POTASSIUM 875; 125 MG/1; MG/1
TABLET, FILM COATED ORAL
Qty: 20 | Refills: 0 | Status: ACTIVE | COMMUNITY
Start: 2018-12-18

## 2020-08-06 RX ORDER — ALPRAZOLAM 0.5 MG/1
TAKE ONE TABLET BY MOUTH EVERY 6 TO 8 HOURS AS NEEDED TABLET ORAL
Qty: 30 | Refills: 2 | Status: ACTIVE | COMMUNITY
Start: 2018-09-07

## 2020-08-06 RX ORDER — HYDROCODONE BITARTRATE AND ACETAMINOPHEN 5; 325 MG/1; MG/1
TABLET ORAL
Qty: 12 | Refills: 0 | Status: ACTIVE | COMMUNITY
Start: 2018-09-10

## 2020-08-06 RX ORDER — FLUCONAZOLE 150 MG/1
TABLET ORAL
Qty: 2 | Refills: 0 | Status: ACTIVE | COMMUNITY
Start: 2018-12-18

## 2020-08-06 RX ORDER — DEXTROAMPHETAMINE SACCHARATE, AMPHETAMINE ASPARTATE, DEXTROAMPHETAMINE SULFATE AND AMPHETAMINE SULFATE 5; 5; 5; 5 MG/1; MG/1; MG/1; MG/1
TABLET ORAL
Qty: 60 | Refills: 0 | Status: ACTIVE | COMMUNITY
Start: 2018-01-08

## 2020-08-06 RX ORDER — PREDNISONE 10 MG/1
TABLET ORAL
Qty: 14 | Refills: 0 | Status: ACTIVE | COMMUNITY
Start: 2020-02-20

## 2020-08-06 RX ORDER — HYDROXYZINE HYDROCHLORIDE 25 MG/1
TAKE 1 TABLET EVERY 6 HOURS PRN TABLET, FILM COATED ORAL
Refills: 0 | Status: ACTIVE | COMMUNITY

## 2020-08-06 RX ORDER — ONDANSETRON HYDROCHLORIDE 4 MG/1
TK 1 T PO Q 6 H PRN NV TABLET, FILM COATED ORAL
Qty: 30 | Refills: 0 | Status: ACTIVE | COMMUNITY
Start: 2018-01-31

## 2020-08-06 RX ORDER — ALPRAZOLAM 1 MG/1
TABLET ORAL
Qty: 30 | Refills: 0 | Status: ACTIVE | COMMUNITY
Start: 2018-05-18

## 2020-08-06 RX ORDER — LINACLOTIDE 72 UG/1
CAPSULE, GELATIN COATED ORAL
Qty: 30 | Refills: 0 | Status: ACTIVE | COMMUNITY
Start: 2018-06-19

## 2020-08-06 RX ORDER — DRONABINOL 2.5 MG/1
CAPSULE ORAL
Qty: 60 | Refills: 0 | Status: ACTIVE | COMMUNITY
Start: 2018-06-11

## 2020-08-09 ENCOUNTER — TELEPHONE ENCOUNTER (OUTPATIENT)
Dept: URBAN - METROPOLITAN AREA CLINIC 113 | Facility: CLINIC | Age: 50
End: 2020-08-09

## 2020-08-09 RX ORDER — DEXLANSOPRAZOLE 60 MG/1
1 CAPSULE CAPSULE, DELAYED RELEASE ORAL ONCE A DAY
Qty: 30 CAPSULE | Refills: 5
Start: 2020-08-06

## 2020-08-10 ENCOUNTER — TELEPHONE ENCOUNTER (OUTPATIENT)
Dept: URBAN - METROPOLITAN AREA CLINIC 113 | Facility: CLINIC | Age: 50
End: 2020-08-10

## 2020-08-10 RX ORDER — DEXLANSOPRAZOLE 60 MG/1
1 CAPSULE CAPSULE, DELAYED RELEASE ORAL ONCE A DAY
Qty: 30 CAPSULE | Refills: 5
Start: 2020-08-06

## 2020-08-12 ENCOUNTER — TELEPHONE ENCOUNTER (OUTPATIENT)
Dept: URBAN - METROPOLITAN AREA CLINIC 113 | Facility: CLINIC | Age: 50
End: 2020-08-12

## 2020-08-12 ENCOUNTER — ERX REFILL RESPONSE (OUTPATIENT)
Age: 50
End: 2020-08-12

## 2020-08-12 RX ORDER — DRONABINOL 10 MG/1
TAKE 1 CAPSULE TWICE DAILY CAPSULE ORAL TWICE A DAY
Qty: 60 | Refills: 5
Start: 2019-05-10 | End: 2021-08-17

## 2020-08-12 RX ORDER — ONDANSETRON 8 MG/1
DISSOLVE ONE TABLET BY MOUTH EVERY 6 TO 8 HOURS AS NEEDED TABLET, ORALLY DISINTEGRATING ORAL
Qty: 30 | Refills: 1

## 2020-08-18 ENCOUNTER — OFFICE VISIT (OUTPATIENT)
Dept: URBAN - METROPOLITAN AREA CLINIC 113 | Facility: CLINIC | Age: 50
End: 2020-08-18

## 2020-08-18 PROBLEM — 48694002 ANXIETY: Status: ACTIVE | Noted: 2020-08-18

## 2020-08-18 PROBLEM — 62315008 DIARRHEA: Status: ACTIVE | Noted: 2020-08-18

## 2020-08-18 PROBLEM — 88111009 CHANGE IN BOWEL HABIT: Status: ACTIVE | Noted: 2020-08-18

## 2020-08-18 RX ORDER — ZOLPIDEM TARTRATE 12.5 MG/1
TABLET, EXTENDED RELEASE ORAL
Qty: 30 | Refills: 0 | Status: ACTIVE | COMMUNITY
Start: 2018-04-06

## 2020-08-18 RX ORDER — CLONAZEPAM 1 MG/1
TK 1 T PO QHS TABLET ORAL
Qty: 30 | Refills: 0 | Status: ACTIVE | COMMUNITY
Start: 2018-01-08

## 2020-08-18 RX ORDER — ESTRADIOL 0.05 MG/D
FILM, EXTENDED RELEASE TRANSDERMAL
Qty: 8 | Refills: 0 | Status: ACTIVE | COMMUNITY
Start: 2018-02-27

## 2020-08-18 RX ORDER — LANSOPRAZOLE 30 MG/1
TAKE 1 CAPSULE TWICE DAILY 30 MINUTES BEFORE MEALS CAPSULE, DELAYED RELEASE ORAL
Qty: 60 | Refills: 5 | Status: ACTIVE | COMMUNITY
Start: 2020-02-10

## 2020-08-18 RX ORDER — PROMETHAZINE HYDROCHLORIDE 12.5 MG/1
TABLET ORAL
Qty: 30 | Refills: 0 | Status: ACTIVE | COMMUNITY
Start: 2018-06-11

## 2020-08-18 RX ORDER — ESTRADIOL 0.1 MG/D
FILM, EXTENDED RELEASE TRANSDERMAL
Qty: 8 | Refills: 0 | Status: ACTIVE | COMMUNITY
Start: 2019-08-08

## 2020-08-18 RX ORDER — DESIPRAMINE HYDROCHLORIDE 10 MG/1
TABLET, FILM COATED ORAL
Qty: 30 | Refills: 0 | Status: ACTIVE | COMMUNITY
Start: 2018-01-31

## 2020-08-18 RX ORDER — TIZANIDINE 4 MG/1
TABLET ORAL
Qty: 30 | Refills: 0 | Status: ACTIVE | COMMUNITY
Start: 2018-11-29

## 2020-08-18 RX ORDER — AMOXICILLIN 875 MG/1
TABLET, FILM COATED ORAL
Qty: 20 | Refills: 0 | Status: ACTIVE | COMMUNITY
Start: 2019-01-26

## 2020-08-18 RX ORDER — OXYCODONE HYDROCHLORIDE 5 MG/1
TABLET ORAL
Qty: 30 | Refills: 0 | Status: ACTIVE | COMMUNITY
Start: 2019-07-17

## 2020-08-18 RX ORDER — CLINDAMYCIN HYDROCHLORIDE 150 MG/1
CAPSULE ORAL
Qty: 60 | Refills: 0 | Status: ACTIVE | COMMUNITY
Start: 2018-09-09

## 2020-08-18 RX ORDER — AMOXICILLIN AND CLAVULANATE POTASSIUM 875; 125 MG/1; MG/1
TABLET, FILM COATED ORAL
Qty: 20 | Refills: 0 | Status: ACTIVE | COMMUNITY
Start: 2018-12-18

## 2020-08-18 RX ORDER — DRONABINOL 2.5 MG/1
CAPSULE ORAL
Qty: 60 | Refills: 0 | Status: ACTIVE | COMMUNITY
Start: 2018-06-11

## 2020-08-18 RX ORDER — PROMETHAZINE HYDROCHLORIDE 6.25 MG/5ML
TAKE 5 ML EVERY 6 HOURS PRN N/V SYRUP ORAL
Qty: 300 | Refills: 3 | Status: ACTIVE | COMMUNITY
Start: 2019-12-10

## 2020-08-18 RX ORDER — BUSPIRONE HYDROCHLORIDE 10 MG/1
TAKE 1 TABLET TWICE DAILY TABLET ORAL
Qty: 180 | Refills: 2 | Status: ACTIVE | COMMUNITY
Start: 2020-02-10

## 2020-08-18 RX ORDER — QUETIAPINE FUMARATE 50 MG/1
TABLET, FILM COATED ORAL
Qty: 30 | Refills: 0 | Status: ACTIVE | COMMUNITY
Start: 2018-08-03

## 2020-08-18 RX ORDER — HYDROCODONE BITARTRATE AND ACETAMINOPHEN 5; 325 MG/1; MG/1
TABLET ORAL
Qty: 12 | Refills: 0 | Status: ACTIVE | COMMUNITY
Start: 2018-09-10

## 2020-08-18 RX ORDER — BUPROPION HYDROCHLORIDE 75 MG/1
TABLET ORAL
Qty: 90 | Refills: 0 | Status: ACTIVE | COMMUNITY
Start: 2020-02-18

## 2020-08-18 RX ORDER — ALPRAZOLAM 2 MG/1
TABLET ORAL
Qty: 180 | Refills: 0 | Status: ACTIVE | COMMUNITY
Start: 2019-01-11

## 2020-08-18 RX ORDER — PREDNISONE 20 MG/1
TK 3 TS PO QD FOR 4 DAYS TABLET ORAL
Qty: 12 | Refills: 0 | Status: ACTIVE | COMMUNITY
Start: 2018-09-10

## 2020-08-18 RX ORDER — ALBUTEROL SULFATE 90 UG/1
AEROSOL, METERED RESPIRATORY (INHALATION)
Qty: 8 | Refills: 0 | Status: ACTIVE | COMMUNITY
Start: 2017-10-08

## 2020-08-18 RX ORDER — FLUOXETINE 10 MG/1
CAPSULE ORAL
Qty: 30 | Refills: 0 | Status: ACTIVE | COMMUNITY
Start: 2018-10-08

## 2020-08-18 RX ORDER — MELOXICAM 15 MG/1
TABLET ORAL
Qty: 30 | Refills: 0 | Status: ACTIVE | COMMUNITY
Start: 2019-04-29

## 2020-08-18 RX ORDER — ALPRAZOLAM 0.25 MG/1
TABLET ORAL
Qty: 2 | Refills: 0 | Status: ACTIVE | COMMUNITY
Start: 2018-10-01

## 2020-08-18 RX ORDER — ONDANSETRON 4 MG/1
TABLET, ORALLY DISINTEGRATING ORAL
Qty: 9 | Refills: 0 | Status: ACTIVE | COMMUNITY
Start: 2017-12-20

## 2020-08-18 RX ORDER — METOPROLOL SUCCINATE 25 MG/1
TABLET, EXTENDED RELEASE ORAL
Qty: 90 | Refills: 0 | Status: ACTIVE | COMMUNITY
Start: 2019-04-08

## 2020-08-18 RX ORDER — ESTRADIOL 0.07 MG/D
FILM, EXTENDED RELEASE TRANSDERMAL
Qty: 8 | Refills: 0 | Status: ACTIVE | COMMUNITY
Start: 2018-03-15

## 2020-08-18 RX ORDER — LOSARTAN POTASSIUM 100 MG/1
TAKE 1 TABLET DAILY TABLET, FILM COATED ORAL
Refills: 0 | Status: ACTIVE | COMMUNITY

## 2020-08-18 RX ORDER — FLUCONAZOLE 100 MG/1
TABLET ORAL
Qty: 8 | Refills: 0 | Status: ACTIVE | COMMUNITY
Start: 2019-10-16

## 2020-08-18 RX ORDER — METOCLOPRAMIDE HYDROCHLORIDE 5 MG/5ML
SOLUTION ORAL
Qty: 946 | Refills: 0 | Status: ACTIVE | COMMUNITY
Start: 2019-05-24

## 2020-08-18 RX ORDER — AMLODIPINE BESYLATE 5 MG/1
TABLET ORAL
Qty: 30 | Refills: 0 | Status: ACTIVE | COMMUNITY
Start: 2019-10-23

## 2020-08-18 RX ORDER — ACYCLOVIR 400 MG/1
TABLET ORAL
Qty: 24 | Refills: 0 | Status: ACTIVE | COMMUNITY
Start: 2019-10-18

## 2020-08-18 RX ORDER — CEFUROXIME AXETIL 250 MG/1
TABLET ORAL
Qty: 20 | Refills: 0 | Status: ACTIVE | COMMUNITY
Start: 2020-02-20

## 2020-08-18 RX ORDER — CODEINE PHOSPHATE AND GUAIFENESIN 10; 100 MG/5ML; MG/5ML
SOLUTION ORAL
Qty: 120 | Refills: 0 | Status: ACTIVE | COMMUNITY
Start: 2020-02-26

## 2020-08-18 RX ORDER — DEXTROAMPHETAMINE SACCHARATE, AMPHETAMINE ASPARTATE, DEXTROAMPHETAMINE SULFATE AND AMPHETAMINE SULFATE 5; 5; 5; 5 MG/1; MG/1; MG/1; MG/1
TABLET ORAL
Qty: 60 | Refills: 0 | Status: ACTIVE | COMMUNITY
Start: 2018-01-08

## 2020-08-18 RX ORDER — ESTRADIOL 0.07 MG/D
PLACE 1 PATCH  TWICE A WEEK PATCH TRANSDERMAL
Refills: 0 | Status: ACTIVE | COMMUNITY

## 2020-08-18 RX ORDER — HYDROXYZINE HYDROCHLORIDE 25 MG/1
TAKE 1 TABLET EVERY 6 HOURS PRN TABLET, FILM COATED ORAL
Refills: 0 | Status: ACTIVE | COMMUNITY

## 2020-08-18 RX ORDER — PREDNISONE 10 MG/1
TABLET ORAL
Qty: 14 | Refills: 0 | Status: ACTIVE | COMMUNITY
Start: 2020-02-20

## 2020-08-18 RX ORDER — NAPROXEN 500 MG/1
TABLET ORAL
Qty: 20 | Refills: 0 | Status: ACTIVE | COMMUNITY
Start: 2017-12-07

## 2020-08-18 RX ORDER — ONDANSETRON HYDROCHLORIDE 4 MG/1
TK 1 T PO Q 6 H PRN NV TABLET, FILM COATED ORAL
Qty: 30 | Refills: 0 | Status: ACTIVE | COMMUNITY
Start: 2018-01-31

## 2020-08-18 RX ORDER — TRIAZOLAM 0.25 MG/1
TABLET ORAL
Qty: 2 | Refills: 0 | Status: ACTIVE | COMMUNITY
Start: 2019-10-08

## 2020-08-18 RX ORDER — HYDROCODONE BITARTRATE AND ACETAMINOPHEN 7.5; 325 MG/15ML; MG/15ML
SOLUTION ORAL
Qty: 473 | Refills: 0 | Status: ACTIVE | COMMUNITY
Start: 2018-05-25

## 2020-08-18 RX ORDER — ESOMEPRAZOLE MAGNESIUM 40 MG/1
TAKE 1 CAPSULE DAILY CAPSULE, DELAYED RELEASE PELLETS ORAL
Qty: 90 | Refills: 2 | Status: ACTIVE | COMMUNITY
Start: 2020-03-31

## 2020-08-18 RX ORDER — PROMETHAZINE HYDROCHLORIDE AND DEXTROMETHORPHAN HYDROBROMIDE 6.25; 15 MG/5ML; MG/5ML
SOLUTION ORAL
Qty: 120 | Refills: 0 | Status: ACTIVE | COMMUNITY
Start: 2019-01-26

## 2020-08-18 RX ORDER — CHLORHEXIDINE GLUCONATE 0.12% ORAL RINSE 1.2 MG/ML
SOLUTION ORAL
Qty: 473 | Refills: 0 | Status: ACTIVE | COMMUNITY
Start: 2018-09-19

## 2020-08-18 RX ORDER — EPINEPHRINE 0.3 MG/.3ML
INJECTION INTRAMUSCULAR
Qty: 2 | Refills: 0 | Status: ACTIVE | COMMUNITY
Start: 2019-04-29

## 2020-08-18 RX ORDER — PENICILLIN V POTASSIUM 500 MG/1
TABLET, FILM COATED ORAL
Qty: 14 | Refills: 0 | Status: ACTIVE | COMMUNITY
Start: 2018-09-10

## 2020-08-18 RX ORDER — FLUCONAZOLE 150 MG/1
TABLET ORAL
Qty: 2 | Refills: 0 | Status: ACTIVE | COMMUNITY
Start: 2018-12-18

## 2020-08-18 RX ORDER — TRIAMCINOLONE ACETONIDE 1 MG/G
CREAM TOPICAL
Qty: 30 | Refills: 0 | Status: ACTIVE | COMMUNITY
Start: 2019-10-18

## 2020-08-18 RX ORDER — ESTRADIOL 1 MG/1
TABLET ORAL
Qty: 90 | Refills: 0 | Status: ACTIVE | COMMUNITY
Start: 2019-07-17

## 2020-08-18 RX ORDER — DOXYCYCLINE HYCLATE 100 MG/1
TABLET ORAL
Qty: 20 | Refills: 0 | Status: ACTIVE | COMMUNITY
Start: 2020-02-26

## 2020-08-18 RX ORDER — IBUPROFEN 800 MG/1
TABLET ORAL
Qty: 30 | Refills: 0 | Status: ACTIVE | COMMUNITY
Start: 2018-09-08

## 2020-08-18 RX ORDER — LINACLOTIDE 72 UG/1
CAPSULE, GELATIN COATED ORAL
Qty: 30 | Refills: 0 | Status: ACTIVE | COMMUNITY
Start: 2018-06-19

## 2020-08-18 RX ORDER — METHOCARBAMOL 750 MG/1
TABLET, FILM COATED ORAL
Qty: 10 | Refills: 0 | Status: ACTIVE | COMMUNITY
Start: 2019-04-29

## 2020-08-18 RX ORDER — MIRTAZAPINE 15 MG/1
TABLET, FILM COATED ORAL
Qty: 30 | Refills: 0 | Status: ACTIVE | COMMUNITY
Start: 2018-08-03

## 2020-08-18 RX ORDER — BUTALBITA,ACETAMINOPHEN AND CAFFEINE 50; 300; 40 MG/1; MG/1; MG/1
CAPSULE ORAL
Qty: 12 | Refills: 0 | Status: ACTIVE | COMMUNITY
Start: 2019-03-26

## 2020-08-18 RX ORDER — AMOXICILLIN 500 MG/1
CAPSULE ORAL
Qty: 30 | Refills: 0 | Status: ACTIVE | COMMUNITY
Start: 2018-09-08

## 2020-08-18 RX ORDER — NIFEDIPINE 10 MG/1
CAPSULE ORAL
Qty: 30 | Refills: 0 | Status: ACTIVE | COMMUNITY
Start: 2018-08-03

## 2020-08-18 RX ORDER — DRONABINOL 5 MG/1
CAPSULE ORAL
Qty: 60 | Refills: 0 | Status: ACTIVE | COMMUNITY
Start: 2018-06-26

## 2020-08-18 RX ORDER — IBUPROFEN 800 MG/1
TABLET ORAL
Qty: 30 | Refills: 0 | Status: ACTIVE | COMMUNITY
Start: 2019-07-17

## 2020-08-18 RX ORDER — LORAZEPAM 1 MG/1
TK 1 T PO  BID PRN FOR ANXIETY TABLET ORAL
Qty: 60 | Refills: 0 | Status: ACTIVE | COMMUNITY
Start: 2018-06-18

## 2020-08-18 RX ORDER — HYDROXYZINE PAMOATE 25 MG/1
CAPSULE ORAL
Qty: 90 | Refills: 0 | Status: ACTIVE | COMMUNITY
Start: 2018-09-19

## 2020-08-18 RX ORDER — ESTRADIOL 2 MG/1
TAKE 1 TABLET DAILY TABLET ORAL
Refills: 0 | Status: ACTIVE | COMMUNITY
Start: 2019-07-29

## 2020-08-18 RX ORDER — ZOLPIDEM TARTRATE 10 MG/1
TABLET, FILM COATED ORAL
Qty: 30 | Refills: 0 | Status: ACTIVE | COMMUNITY
Start: 2018-03-02

## 2020-08-18 RX ORDER — ALPRAZOLAM 1 MG/1
TABLET ORAL
Qty: 30 | Refills: 0 | Status: ACTIVE | COMMUNITY
Start: 2018-05-18

## 2020-08-18 RX ORDER — PROCHLORPERAZINE MALEATE 10 MG/1
TAKE 1 TABLET EVERY 6 HOURS AS NEEDED FOR NAUSEA TABLET ORAL
Qty: 120 | Refills: 1 | Status: ACTIVE | COMMUNITY
Start: 2019-11-21

## 2020-08-18 RX ORDER — CYCLOBENZAPRINE HYDROCHLORIDE 10 MG/1
TABLET, FILM COATED ORAL
Qty: 15 | Refills: 0 | Status: ACTIVE | COMMUNITY
Start: 2018-11-26

## 2020-08-18 RX ORDER — TRAMADOL HYDROCHLORIDE 50 MG/1
TAKE 1 TABLET 4 TIMES DAILY AS NEEDED FOR PAIN TABLET ORAL
Qty: 20 | Refills: 0 | Status: ACTIVE | COMMUNITY

## 2020-08-18 RX ORDER — ALPRAZOLAM 0.5 MG/1
TAKE ONE TABLET BY MOUTH EVERY 6 TO 8 HOURS AS NEEDED TABLET ORAL
Qty: 30 | Refills: 2 | Status: ACTIVE | COMMUNITY
Start: 2018-09-07

## 2020-08-18 RX ORDER — PROMETHAZINE HYDROCHLORIDE 25 MG/1
TAKE 1 TABLET EVERY 6 HOURS PRN NAUSEA TABLET ORAL
Qty: 60 | Refills: 3 | Status: ACTIVE | COMMUNITY
Start: 2019-12-10

## 2020-08-18 RX ORDER — POLYETHYLENE GLYCOL 3350, SODIUM CHLORIDE, SODIUM BICARBONATE AND POTASSIUM CHLORIDE WITH LEMON FLAVOR 420; 11.2; 5.72; 1.48 G/4L; G/4L; G/4L; G/4L
POWDER, FOR SOLUTION ORAL
Qty: 4000 | Refills: 0 | Status: ACTIVE | COMMUNITY
Start: 2018-02-01

## 2020-08-18 RX ORDER — DRONABINOL 10 MG/1
TAKE 1 CAPSULE TWICE DAILY CAPSULE ORAL TWICE A DAY
Qty: 60 | Refills: 5 | Status: ACTIVE | COMMUNITY
Start: 2019-05-10 | End: 2021-08-17

## 2020-08-18 RX ORDER — ONDANSETRON 8 MG/1
DISSOLVE ONE TABLET BY MOUTH EVERY 6 TO 8 HOURS AS NEEDED TABLET, ORALLY DISINTEGRATING ORAL
Qty: 30 | Refills: 1 | Status: ACTIVE | COMMUNITY

## 2020-08-18 RX ORDER — ACYCLOVIR 800 MG/1
TK 1 T PO BID TABLET ORAL
Qty: 20 | Refills: 0 | Status: ACTIVE | COMMUNITY
Start: 2017-07-24

## 2020-08-18 RX ORDER — DEXLANSOPRAZOLE 60 MG/1
1 CAPSULE CAPSULE, DELAYED RELEASE ORAL ONCE A DAY
Qty: 30 CAPSULE | Refills: 5 | Status: ACTIVE | COMMUNITY
Start: 2020-08-06

## 2020-08-18 RX ORDER — DICYCLOMINE HYDROCHLORIDE 20 MG/1
TABLET ORAL
Qty: 30 | Refills: 0 | Status: ACTIVE | COMMUNITY
Start: 2018-01-31

## 2020-09-05 ENCOUNTER — ERX REFILL RESPONSE (OUTPATIENT)
Age: 50
End: 2020-09-05

## 2020-09-05 RX ORDER — PROMETHAZINE HYDROCHLORIDE 25 MG/1
TAKE ONE TABLET BY MOUTH EVERY 6 HOURS AS NEEDED FOR NAUSEA TABLET ORAL
Qty: 60 | Refills: 2

## 2020-09-25 ENCOUNTER — OFFICE VISIT (OUTPATIENT)
Dept: URBAN - METROPOLITAN AREA TELEHEALTH 2 | Facility: TELEHEALTH | Age: 50
End: 2020-09-25
Payer: OTHER GOVERNMENT

## 2020-09-25 VITALS — HEIGHT: 66 IN | BODY MASS INDEX: 21.38 KG/M2 | WEIGHT: 133 LBS | RESPIRATION RATE: 18 BRPM

## 2020-09-25 DIAGNOSIS — R11.2 NAUSEA AND VOMITING: ICD-10-CM

## 2020-09-25 DIAGNOSIS — K21.9 GERD: ICD-10-CM

## 2020-09-25 DIAGNOSIS — F12.20 MARIJUANA DEPENDENCE: ICD-10-CM

## 2020-09-25 DIAGNOSIS — R10.84 GENERALIZED ABDOMINAL PAIN: ICD-10-CM

## 2020-09-25 PROCEDURE — 99213 OFFICE O/P EST LOW 20 MIN: CPT | Performed by: INTERNAL MEDICINE

## 2020-09-25 RX ORDER — PROCHLORPERAZINE MALEATE 10 MG/1
TAKE 1 TABLET EVERY 6 HOURS AS NEEDED FOR NAUSEA TABLET ORAL
OUTPATIENT
Start: 2019-11-21

## 2020-09-25 RX ORDER — ONDANSETRON 8 MG/1
DISSOLVE ONE TABLET BY MOUTH EVERY 6 TO 8 HOURS AS NEEDED TABLET, ORALLY DISINTEGRATING ORAL
Qty: 30 | Refills: 1 | Status: ACTIVE | COMMUNITY

## 2020-09-25 RX ORDER — PROMETHAZINE HYDROCHLORIDE 25 MG/1
TAKE ONE TABLET BY MOUTH EVERY 6 HOURS AS NEEDED FOR NAUSEA TABLET ORAL
Qty: 60 | Refills: 2 | Status: ACTIVE | COMMUNITY

## 2020-09-25 RX ORDER — HYDROXYZINE HYDROCHLORIDE 25 MG/1
TAKE 1 TABLET EVERY 6 HOURS PRN TABLET, FILM COATED ORAL
Refills: 0 | Status: ACTIVE | COMMUNITY

## 2020-09-25 RX ORDER — LOSARTAN POTASSIUM 100 MG/1
TAKE 1 TABLET DAILY TABLET, FILM COATED ORAL
Refills: 0 | Status: ACTIVE | COMMUNITY

## 2020-09-25 RX ORDER — PROCHLORPERAZINE MALEATE 10 MG/1
TAKE 1 TABLET EVERY 6 HOURS AS NEEDED FOR NAUSEA TABLET ORAL
Qty: 120 | Refills: 1 | Status: ACTIVE | COMMUNITY
Start: 2019-11-21

## 2020-09-25 RX ORDER — ESTRADIOL 2 MG/1
TAKE 1 TABLET DAILY TABLET ORAL
Refills: 0 | Status: ACTIVE | COMMUNITY
Start: 2019-07-29

## 2020-09-25 RX ORDER — DEXLANSOPRAZOLE 60 MG/1
1 CAPSULE CAPSULE, DELAYED RELEASE ORAL ONCE A DAY
Qty: 30 CAPSULE | Refills: 5 | Status: ACTIVE | COMMUNITY
Start: 2020-08-06

## 2020-09-25 RX ORDER — ONDANSETRON 8 MG/1
DISSOLVE ONE TABLET BY MOUTH EVERY 6 TO 8 HOURS AS NEEDED TABLET, ORALLY DISINTEGRATING ORAL
OUTPATIENT

## 2020-09-25 RX ORDER — DEXLANSOPRAZOLE 60 MG/1
1 CAPSULE CAPSULE, DELAYED RELEASE ORAL ONCE A DAY
OUTPATIENT
Start: 2020-08-06

## 2020-09-25 RX ORDER — ESTRADIOL 0.07 MG/D
PLACE 1 PATCH  TWICE A WEEK PATCH TRANSDERMAL
Refills: 0 | Status: ACTIVE | COMMUNITY

## 2020-09-25 RX ORDER — DRONABINOL 10 MG/1
TAKE 1 CAPSULE TWICE DAILY CAPSULE ORAL TWICE A DAY
Refills: 5
Start: 2019-05-10 | End: 2019-11-05

## 2020-09-25 RX ORDER — DRONABINOL 10 MG/1
TAKE 1 CAPSULE TWICE DAILY CAPSULE ORAL TWICE A DAY
Qty: 60 | Refills: 5 | Status: ACTIVE | COMMUNITY
Start: 2019-05-10 | End: 2021-08-17

## 2020-09-25 RX ORDER — TRAMADOL HYDROCHLORIDE 50 MG/1
TAKE 1 TABLET 4 TIMES DAILY AS NEEDED FOR PAIN TABLET ORAL
Qty: 20 | Refills: 0 | Status: ACTIVE | COMMUNITY

## 2020-09-25 RX ORDER — PROMETHAZINE HYDROCHLORIDE 25 MG/1
TAKE ONE TABLET BY MOUTH EVERY 6 HOURS AS NEEDED FOR NAUSEA TABLET ORAL
OUTPATIENT

## 2020-09-25 RX ORDER — ALPRAZOLAM 0.5 MG/1
TAKE ONE TABLET BY MOUTH EVERY 6 TO 8 HOURS AS NEEDED TABLET ORAL
Qty: 30 | Refills: 2 | Status: ACTIVE | COMMUNITY
Start: 2018-09-07

## 2020-09-25 RX ORDER — BUPROPION HYDROCHLORIDE 75 MG/1
1 TABLETS TABLET ORAL TWICE A DAY
Status: ACTIVE | COMMUNITY

## 2020-09-25 RX ORDER — METOPROLOL SUCCINATE 25 MG/1
TABLET, EXTENDED RELEASE ORAL
Qty: 90 | Refills: 0 | Status: ACTIVE | COMMUNITY
Start: 2019-04-08

## 2020-09-25 NOTE — HPI-TODAY'S VISIT:
Ms. Atkins is a 50-year-old woman with a history of GERD status post Nissen fundoplication with revision, chronic marijuana use presenting for follow-up regarding GERD and nausea with vomiting.  She was last seen on 2/10/20 for follow-up regarding nausea and vomiting, GERD and constipation predominant change in bowel habits.  She was recommended to use Buspar 10 mg bid for anxiety, continue marinol 10 mg bid, trial Lansoprazole 30 mg bid before meals (due to high-cost of Dexilant), and use zofran, promethazine or compazine for relief of exacerbations of nausea/vomiting.  We spoke about the need to decrease marijuana use.  She reports ongoing issues with nausea and vomiting that is overall stable on a regimen of Marinol 10 mg bid, Phenergan 25 mg po q6h, prn, Zofran 8 mg q8h prn, Dexilant 60 mg daily.  She reports that lansoprazole caused headaches and pantoprazole 40 mg bid was ineffective.  Dexilant 60 mg daily is the most effective PPI, but she is having to pay ~$400 per month.  Her weight is stable.  She reports that she "drinks her calories" as she tolerates me  She continues to smoke marijuana daily.  Her bowel movement are "ok".  She denies any melena or BRBPR.  No dysphagia or odyndophagia.

## 2020-09-25 NOTE — HPI-OTHER HISTORIES
CT abdomen and pelvis without contrast (9/5/18): showed mild gastric intestinal stasis or ileus pattern, small pericardial effusion, hiatal hernia, cholecystectomy, gastritis, and was negative for renal or ureteral stone.  EGD (7/11/18): Irregular squamocolumnar junction status post biopsy showing reflux esophagitis but no Mcgovern's esophagus otherwise normal esophagus, changes consistent with fundoplication appears intact, normal stomach, normal duodenum

## 2020-10-14 ENCOUNTER — TELEPHONE ENCOUNTER (OUTPATIENT)
Dept: URBAN - METROPOLITAN AREA CLINIC 113 | Facility: CLINIC | Age: 50
End: 2020-10-14

## 2020-10-14 RX ORDER — PROMETHAZINE HYDROCHLORIDE 25 MG/1
TAKE ONE TABLET BY MOUTH EVERY 6 HOURS AS NEEDED FOR NAUSEA TABLET ORAL
Qty: 90 | Refills: 5

## 2020-10-14 RX ORDER — DEXLANSOPRAZOLE 60 MG/1
1 CAPSULE CAPSULE, DELAYED RELEASE ORAL ONCE A DAY
Qty: 30 CAPSULE | Refills: 5
Start: 2020-08-06

## 2020-12-02 ENCOUNTER — ERX REFILL RESPONSE (OUTPATIENT)
Age: 50
End: 2020-12-02

## 2020-12-02 RX ORDER — ONDANSETRON 8 MG/1
DISSOLVE ONE TABLET BY MOUTH EVERY 6 TO 8 HOURS AS NEEDED TABLET, ORALLY DISINTEGRATING ORAL
Qty: 30 | Refills: 0

## 2020-12-02 RX ORDER — PROCHLORPERAZINE MALEATE 10 MG/1
TAKE ONE TABLET BY MOUTH EVERY 6 HOURS AS NEEDED FOR NAUSEA TABLET ORAL
Qty: 120 | Refills: 0

## 2021-03-24 ENCOUNTER — TELEPHONE ENCOUNTER (OUTPATIENT)
Dept: URBAN - METROPOLITAN AREA CLINIC 113 | Facility: CLINIC | Age: 51
End: 2021-03-24

## 2021-05-03 ENCOUNTER — WEB ENCOUNTER (OUTPATIENT)
Dept: URBAN - METROPOLITAN AREA CLINIC 113 | Facility: CLINIC | Age: 51
End: 2021-05-03

## 2021-05-03 ENCOUNTER — OFFICE VISIT (OUTPATIENT)
Dept: URBAN - METROPOLITAN AREA CLINIC 113 | Facility: CLINIC | Age: 51
End: 2021-05-03
Payer: OTHER GOVERNMENT

## 2021-05-03 VITALS
HEART RATE: 113 BPM | SYSTOLIC BLOOD PRESSURE: 144 MMHG | WEIGHT: 129 LBS | BODY MASS INDEX: 20.73 KG/M2 | HEIGHT: 66 IN | DIASTOLIC BLOOD PRESSURE: 97 MMHG | TEMPERATURE: 98.7 F

## 2021-05-03 DIAGNOSIS — F12.20 MARIJUANA DEPENDENCE: ICD-10-CM

## 2021-05-03 DIAGNOSIS — R10.84 GENERALIZED ABDOMINAL PAIN: ICD-10-CM

## 2021-05-03 DIAGNOSIS — R11.2 NAUSEA AND VOMITING: ICD-10-CM

## 2021-05-03 DIAGNOSIS — Z12.11 ENCOUNTER FOR SCREENING COLONOSCOPY: ICD-10-CM

## 2021-05-03 DIAGNOSIS — K21.9 GERD: ICD-10-CM

## 2021-05-03 PROBLEM — 102614006 GENERALIZED ABDOMINAL PAIN: Status: ACTIVE | Noted: 2020-08-18

## 2021-05-03 PROBLEM — 85005007 CANNABIS DEPENDENCE: Status: ACTIVE | Noted: 2020-08-18

## 2021-05-03 PROBLEM — 235595009 GASTROESOPHAGEAL REFLUX DISEASE: Status: ACTIVE | Noted: 2020-08-18

## 2021-05-03 PROCEDURE — 99212 OFFICE O/P EST SF 10 MIN: CPT | Performed by: INTERNAL MEDICINE

## 2021-05-03 RX ORDER — ALPRAZOLAM 0.5 MG/1
TAKE ONE TABLET BY MOUTH EVERY 6 TO 8 HOURS AS NEEDED TABLET ORAL
Qty: 30 | Refills: 2 | Status: ON HOLD | COMMUNITY
Start: 2018-09-07

## 2021-05-03 RX ORDER — METOPROLOL SUCCINATE 25 MG/1
TABLET, EXTENDED RELEASE ORAL
Qty: 90 | Refills: 0 | Status: ACTIVE | COMMUNITY
Start: 2019-04-08

## 2021-05-03 RX ORDER — HYDROXYZINE HYDROCHLORIDE 25 MG/1
TAKE 1 TABLET EVERY 6 HOURS PRN TABLET, FILM COATED ORAL
Refills: 0 | Status: ON HOLD | COMMUNITY

## 2021-05-03 RX ORDER — DEXTROAMPHETAMINE SACCHARATE, AMPHETAMINE ASPARTATE, DEXTROAMPHETAMINE SULFATE, AND AMPHETAMINE SULFATE 5; 5; 5; 5 MG/1; MG/1; MG/1; MG/1
1 TABLET UNKNOWN DOSAGE TABLET ORAL ONCE DAILY
Status: ACTIVE | COMMUNITY

## 2021-05-03 RX ORDER — DEXLANSOPRAZOLE 60 MG/1
1 CAPSULE CAPSULE, DELAYED RELEASE ORAL ONCE A DAY
Qty: 30 CAPSULE | Refills: 5 | Status: ACTIVE | COMMUNITY
Start: 2020-08-06

## 2021-05-03 RX ORDER — TRAMADOL HYDROCHLORIDE 50 MG/1
TAKE 1 TABLET 4 TIMES DAILY AS NEEDED FOR PAIN TABLET ORAL
Qty: 20 | Refills: 0 | Status: ON HOLD | COMMUNITY

## 2021-05-03 RX ORDER — DEXLANSOPRAZOLE 60 MG/1
1 CAPSULE CAPSULE, DELAYED RELEASE ORAL ONCE A DAY
Qty: 30 | Refills: 6 | OUTPATIENT

## 2021-05-03 RX ORDER — ESTRADIOL 2 MG/1
TAKE 1 TABLET DAILY TABLET ORAL
Refills: 0 | Status: ON HOLD | COMMUNITY
Start: 2019-07-29

## 2021-05-03 RX ORDER — LOSARTAN POTASSIUM 100 MG/1
TAKE 1 TABLET DAILY TABLET, FILM COATED ORAL
Refills: 0 | Status: ACTIVE | COMMUNITY

## 2021-05-03 RX ORDER — ONDANSETRON 8 MG/1
DISSOLVE ONE TABLET BY MOUTH EVERY 6 TO 8 HOURS AS NEEDED TABLET, ORALLY DISINTEGRATING ORAL
Qty: 30 | Refills: 0 | Status: ACTIVE | COMMUNITY

## 2021-05-03 RX ORDER — PROMETHAZINE HYDROCHLORIDE 25 MG/1
TAKE ONE TABLET BY MOUTH EVERY 6 HOURS AS NEEDED FOR NAUSEA TABLET ORAL
Qty: 90 | Refills: 5 | Status: ACTIVE | COMMUNITY

## 2021-05-03 RX ORDER — ESTRADIOL 0.07 MG/D
PLACE 1 PATCH  TWICE A WEEK PATCH TRANSDERMAL
Refills: 0 | Status: ON HOLD | COMMUNITY

## 2021-05-03 RX ORDER — PROCHLORPERAZINE MALEATE 10 MG/1
TAKE ONE TABLET BY MOUTH EVERY 6 HOURS AS NEEDED FOR NAUSEA TABLET ORAL
Qty: 120 | Refills: 0 | Status: ACTIVE | COMMUNITY

## 2021-05-03 RX ORDER — BUPROPION HYDROCHLORIDE 75 MG/1
1 TABLETS TABLET ORAL TWICE A DAY
Status: ACTIVE | COMMUNITY

## 2021-05-03 NOTE — HPI-TODAY'S VISIT:
Ms. Atkins is a 51-year-old woman with a history of GERD status post Nissen fundoplication with revision, chronic marijuana use presenting for medication refill.  She was last seen in this office on 9/25/2020 for follow-up regarding GERD and nausea with vomiting.  At that time she reported ongoing issues with nausea and vomiting that was stable on a regimen of Marinol 10 mg twice daily, Phenergan 25 mg p.o.q6r, prn, Zofran 8 mg q8 prn, Dexilant 60 mg daily.  Her symptoms were suspected to be likely due to cannabinoid hyperemesis syndrome.  She was encouraged to continue her follow-up with psychiatry.  Marijuana abstinence encouraged.  She was to continue regimen consisting of Droanbinol 2 mg twice daily, ondansetron 8 mg as needed, promethazine 25 mg as needed for nausea and Compazine 10 as needed for nausea.  Today she reports similar complaints of ongoing issues with nausea and vomiting that have been stable on regimen of Marinol 10 mg twice daily, Phenergan 25 mg as needed, Zofran 8mg as needed, and Dexilant 60 mg daily.  She states that she has been alternating between pantoprazole 40 mg twice daily and Dexilant 60 mg as Dexilant proved to be expensive.  Her weight is stable.  She continues to smoke marijuana daily.  She is having a bowel movement every 2 to 3 days.  She denies any melena or bright red blood per rectum.  Denies dysphagia, odynophagia or regurgitation.

## 2022-03-04 ENCOUNTER — OFFICE VISIT (OUTPATIENT)
Dept: URBAN - METROPOLITAN AREA CLINIC 113 | Facility: CLINIC | Age: 52
End: 2022-03-04
Payer: OTHER GOVERNMENT

## 2022-03-04 VITALS
DIASTOLIC BLOOD PRESSURE: 95 MMHG | WEIGHT: 122 LBS | HEART RATE: 121 BPM | BODY MASS INDEX: 19.61 KG/M2 | RESPIRATION RATE: 20 BRPM | HEIGHT: 66 IN | SYSTOLIC BLOOD PRESSURE: 148 MMHG | TEMPERATURE: 99.3 F

## 2022-03-04 DIAGNOSIS — R63.4 WEIGHT LOSS: ICD-10-CM

## 2022-03-04 DIAGNOSIS — Z12.11 COLON CANCER SCREENING: ICD-10-CM

## 2022-03-04 DIAGNOSIS — R11.2 NAUSEA AND VOMITING: ICD-10-CM

## 2022-03-04 DIAGNOSIS — K59.01 SLOW TRANSIT CONSTIPATION: ICD-10-CM

## 2022-03-04 PROCEDURE — 99213 OFFICE O/P EST LOW 20 MIN: CPT | Performed by: INTERNAL MEDICINE

## 2022-03-04 RX ORDER — TRAMADOL HYDROCHLORIDE 50 MG/1
TAKE 1 TABLET 4 TIMES DAILY AS NEEDED FOR PAIN TABLET ORAL
Qty: 20 | Refills: 0 | Status: ON HOLD | COMMUNITY

## 2022-03-04 RX ORDER — LOSARTAN POTASSIUM 100 MG/1
TAKE 1 TABLET DAILY TABLET, FILM COATED ORAL
Refills: 0 | Status: ON HOLD | COMMUNITY

## 2022-03-04 RX ORDER — HYDROXYZINE HYDROCHLORIDE 25 MG/1
TAKE 1 TABLET EVERY 6 HOURS PRN TABLET, FILM COATED ORAL
Refills: 0 | Status: ON HOLD | COMMUNITY

## 2022-03-04 RX ORDER — PROMETHAZINE HYDROCHLORIDE 25 MG/1
TAKE ONE TABLET BY MOUTH EVERY 6 HOURS AS NEEDED FOR NAUSEA TABLET ORAL
OUTPATIENT

## 2022-03-04 RX ORDER — DEXLANSOPRAZOLE 60 MG/1
1 CAPSULE CAPSULE, DELAYED RELEASE ORAL ONCE A DAY
Qty: 30 | Refills: 6 | Status: ON HOLD | COMMUNITY

## 2022-03-04 RX ORDER — ALPRAZOLAM 0.5 MG/1
TAKE ONE TABLET BY MOUTH EVERY 6 TO 8 HOURS AS NEEDED TABLET ORAL
Qty: 30 | Refills: 2 | Status: ON HOLD | COMMUNITY
Start: 2018-09-07

## 2022-03-04 RX ORDER — METOPROLOL SUCCINATE 25 MG/1
TABLET, EXTENDED RELEASE ORAL
Qty: 90 | Refills: 0 | Status: ON HOLD | COMMUNITY
Start: 2019-04-08

## 2022-03-04 RX ORDER — ALPRAZOLAM 2 MG/1
1 TABLET TABLET ORAL QHS
Status: ACTIVE | COMMUNITY

## 2022-03-04 RX ORDER — DEXTROAMPHETAMINE SACCHARATE, AMPHETAMINE ASPARTATE, DEXTROAMPHETAMINE SULFATE, AND AMPHETAMINE SULFATE 5; 5; 5; 5 MG/1; MG/1; MG/1; MG/1
1 TABLET UNKNOWN DOSAGE TABLET ORAL ONCE DAILY
Status: ACTIVE | COMMUNITY

## 2022-03-04 RX ORDER — ONDANSETRON 8 MG/1
DISSOLVE ONE TABLET BY MOUTH EVERY 6 TO 8 HOURS AS NEEDED TABLET, ORALLY DISINTEGRATING ORAL
Qty: 30 | Refills: 0 | Status: ACTIVE | COMMUNITY

## 2022-03-04 RX ORDER — PROMETHAZINE HYDROCHLORIDE 25 MG/1
TAKE ONE TABLET BY MOUTH EVERY 6 HOURS AS NEEDED FOR NAUSEA TABLET ORAL
Qty: 90 | Refills: 5 | Status: ACTIVE | COMMUNITY

## 2022-03-04 RX ORDER — ESTRADIOL 0.07 MG/D
PLACE 1 PATCH  TWICE A WEEK PATCH TRANSDERMAL
Refills: 0 | Status: ON HOLD | COMMUNITY

## 2022-03-04 RX ORDER — PROCHLORPERAZINE MALEATE 10 MG/1
TAKE ONE TABLET BY MOUTH EVERY 6 HOURS AS NEEDED FOR NAUSEA TABLET ORAL
Qty: 120 | Refills: 0 | Status: ON HOLD | COMMUNITY

## 2022-03-04 RX ORDER — ESTRADIOL 2 MG/1
TAKE 1 TABLET DAILY TABLET ORAL
Refills: 0 | Status: ON HOLD | COMMUNITY
Start: 2019-07-29

## 2022-03-04 RX ORDER — ONDANSETRON 8 MG/1
DISSOLVE ONE TABLET BY MOUTH EVERY 6 TO 8 HOURS AS NEEDED TABLET, ORALLY DISINTEGRATING ORAL
OUTPATIENT

## 2022-03-04 RX ORDER — BUPROPION HYDROCHLORIDE 75 MG/1
1 TABLETS TABLET ORAL TWICE A DAY
Status: ON HOLD | COMMUNITY

## 2022-03-04 NOTE — HPI-TODAY'S VISIT:
Ms. Atkins is a 51-year-old woman with a history of GERD status post Nissen fundoplication with revision, chronic marijuana use presenting for follow up regarding nausea, vomiting and weight loss.  She was last seen on 5/3/2021 for follow-up regarding chronic issues with GERD, nausea vomiting, generalized abdominal pain and marijuana dependence.  She is recommended to continue Dexilant 60 mg daily and she was encouraged to discontinue her marijuana use.  Since her last appointment she reports self-discontinuing most of her medications.  She feels that this has been very helpful.  She has minimal nausea with no vomiting.  Her nausea somewhat improves with Zofran and/or promethazine.  Previous trial of Remeron resulted in significant fatigue necessitating its discontinuation, and a trial of buspirone was not helpful.  No heartburn or dysphagia.  She has experienced a decrease in her appetite and has lost weight.  She typically has a small bowel movement every few days.  She continues to smoke marijuana on a daily basis.  Labs on 1/20/2022 show normal CBC, normal basic metabolic panel.  Regarding colon cancer screening, she reports having screening at [facility] in 2019.  I am unable to locate records.

## 2022-03-22 ENCOUNTER — TELEPHONE ENCOUNTER (OUTPATIENT)
Dept: URBAN - METROPOLITAN AREA CLINIC 113 | Facility: CLINIC | Age: 52
End: 2022-03-22

## 2022-03-22 PROBLEM — 89362005: Status: ACTIVE | Noted: 2022-03-22

## 2022-03-22 PROBLEM — 305058001: Status: ACTIVE | Noted: 2022-03-22

## 2022-03-22 PROBLEM — 35298007: Status: ACTIVE | Noted: 2022-03-22

## 2022-03-22 PROBLEM — 16932000 NAUSEA AND VOMITING: Status: ACTIVE | Noted: 2020-08-18

## 2023-10-25 ENCOUNTER — DASHBOARD ENCOUNTERS (OUTPATIENT)
Age: 53
End: 2023-10-25

## 2023-10-25 ENCOUNTER — OFFICE VISIT (OUTPATIENT)
Dept: URBAN - METROPOLITAN AREA CLINIC 113 | Facility: CLINIC | Age: 53
End: 2023-10-25
Payer: OTHER GOVERNMENT

## 2023-10-25 VITALS
HEART RATE: 94 BPM | BODY MASS INDEX: 23.78 KG/M2 | HEIGHT: 66 IN | DIASTOLIC BLOOD PRESSURE: 105 MMHG | SYSTOLIC BLOOD PRESSURE: 167 MMHG | WEIGHT: 148 LBS | TEMPERATURE: 97.5 F

## 2023-10-25 DIAGNOSIS — R74.8 ELEVATED LIVER ENZYMES: ICD-10-CM

## 2023-10-25 DIAGNOSIS — K59.01 SLOW TRANSIT CONSTIPATION: ICD-10-CM

## 2023-10-25 DIAGNOSIS — R11.2 NAUSEA AND VOMITING: ICD-10-CM

## 2023-10-25 PROCEDURE — 99213 OFFICE O/P EST LOW 20 MIN: CPT | Performed by: INTERNAL MEDICINE

## 2023-10-25 RX ORDER — TRAMADOL HYDROCHLORIDE 50 MG/1
TAKE 1 TABLET 4 TIMES DAILY AS NEEDED FOR PAIN TABLET ORAL
Qty: 20 | Refills: 0 | Status: ON HOLD | COMMUNITY

## 2023-10-25 RX ORDER — ONDANSETRON 8 MG/1
DISSOLVE ONE TABLET BY MOUTH EVERY 6 TO 8 HOURS AS NEEDED TABLET, ORALLY DISINTEGRATING ORAL
Status: ACTIVE | COMMUNITY

## 2023-10-25 RX ORDER — BUPROPION HYDROCHLORIDE 75 MG/1
1 TABLETS TABLET ORAL TWICE A DAY
Status: ON HOLD | COMMUNITY

## 2023-10-25 RX ORDER — DEXLANSOPRAZOLE 60 MG/1
1 CAPSULE CAPSULE, DELAYED RELEASE ORAL ONCE A DAY
Qty: 30 | Refills: 6 | Status: ON HOLD | COMMUNITY

## 2023-10-25 RX ORDER — ESTRADIOL 0.07 MG/D
PLACE 1 PATCH  TWICE A WEEK PATCH TRANSDERMAL
Refills: 0 | Status: ON HOLD | COMMUNITY

## 2023-10-25 RX ORDER — ONDANSETRON 8 MG/1
DISSOLVE ONE TABLET BY MOUTH EVERY 6 TO 8 HOURS AS NEEDED TABLET, ORALLY DISINTEGRATING ORAL
OUTPATIENT

## 2023-10-25 RX ORDER — ALPRAZOLAM 2 MG/1
1 TABLET TABLET ORAL QHS
Status: ACTIVE | COMMUNITY

## 2023-10-25 RX ORDER — METOPROLOL SUCCINATE 25 MG/1
TABLET, EXTENDED RELEASE ORAL
Qty: 90 | Refills: 0 | Status: ON HOLD | COMMUNITY
Start: 2019-04-08

## 2023-10-25 RX ORDER — ALPRAZOLAM 0.5 MG/1
TAKE ONE TABLET BY MOUTH EVERY 6 TO 8 HOURS AS NEEDED TABLET ORAL
Qty: 30 | Refills: 2 | Status: ON HOLD | COMMUNITY
Start: 2018-09-07

## 2023-10-25 RX ORDER — LOSARTAN POTASSIUM 100 MG/1
TAKE 1 TABLET DAILY TABLET, FILM COATED ORAL
Refills: 0 | Status: ON HOLD | COMMUNITY

## 2023-10-25 RX ORDER — PROMETHAZINE HYDROCHLORIDE 25 MG/1
TAKE ONE TABLET BY MOUTH EVERY 6 HOURS AS NEEDED FOR NAUSEA TABLET ORAL
OUTPATIENT

## 2023-10-25 RX ORDER — HYDROXYZINE HYDROCHLORIDE 25 MG/1
TAKE 1 TABLET EVERY 6 HOURS PRN TABLET, FILM COATED ORAL
Refills: 0 | Status: ON HOLD | COMMUNITY

## 2023-10-25 RX ORDER — DEXTROAMPHETAMINE SACCHARATE, AMPHETAMINE ASPARTATE, DEXTROAMPHETAMINE SULFATE, AND AMPHETAMINE SULFATE 2.5; 2.5; 2.5; 2.5 MG/1; MG/1; MG/1; MG/1
1 TABLET UNKNOWN DOSAGE TABLET ORAL ONCE DAILY
Refills: 0 | Status: ACTIVE | COMMUNITY

## 2023-10-25 RX ORDER — PROMETHAZINE HYDROCHLORIDE 25 MG/1
TAKE ONE TABLET BY MOUTH EVERY 6 HOURS AS NEEDED FOR NAUSEA TABLET ORAL
Status: ACTIVE | COMMUNITY

## 2023-10-25 RX ORDER — ESTRADIOL 2 MG/1
TAKE 1 TABLET DAILY TABLET ORAL
Refills: 0 | Status: ON HOLD | COMMUNITY
Start: 2019-07-29

## 2023-10-25 RX ORDER — PROCHLORPERAZINE MALEATE 10 MG/1
TAKE ONE TABLET BY MOUTH EVERY 6 HOURS AS NEEDED FOR NAUSEA TABLET ORAL
Qty: 120 | Refills: 0 | Status: ON HOLD | COMMUNITY

## 2023-10-25 NOTE — HPI-TODAY'S VISIT:
Ms. Atkins is a 51-year-old woman with a history of GERD status post Nissen fundoplication with revision, chronic marijuana use presenting for follow up regarding nausea, vomiting, weight loss, and recent ED visit.   Last seen 03/04/2022 and reported no improvement of her nausea and vomiting with PPI. She uses zofran and/or phenergan as needed. She continues marijuana use. Her last colon cancer screen was completed by Dr. Carlos Naqvi at [facility] on 5/24/2019 bowel prep was poor and revealed internal hemmorhoids, otherwise normal colon. EGD on 5/24/2019 a hiatal hernia, esophagus with area of salmon-colored mucosa above the Z-line, stomach slightly dilated and filled with foam. Nissen Fundoplication slipped above diaphragm, patent pylorus. normal examined duodenum. Pathology did not demonstrate any histophathologic abnormalities.   Since her last appointment she was seen at [facility] ED on 8/30/2023 for severe acute epigastric abdominal pain. Labs completed at the ED notable for elevated WBC 14.7, elevated , elevated ALT 84, lipase 300. XRAY abdomen series + chest showed no acute cardiopulmonary abnormalities and nonobstructive bowel gas pattern with moderate stool burden. The etiology of the pain was unknown. She was discharged with bentyl for abdominal pain and to continue her zofran and/or phenergan. er nausea somewhat improves with Zofran and/or promethazine.  Today she reports with no abdominal complaints. She typically has a small bowel movement every day. There is no blood or melena. She continues to endorse nausea and vomiting daily that is somewhat controlled with zofran and/or phenergan. No dysphagia, heartburn, regurgitation, hematemesis. No complaints of bloating. No jaundice, icterus. She continues to smoke marijuana on a daily basis. Weight is stable.

## 2024-01-29 ENCOUNTER — OFFICE VISIT (OUTPATIENT)
Dept: URBAN - METROPOLITAN AREA CLINIC 113 | Facility: CLINIC | Age: 54
End: 2024-01-29